# Patient Record
Sex: FEMALE | Race: BLACK OR AFRICAN AMERICAN
[De-identification: names, ages, dates, MRNs, and addresses within clinical notes are randomized per-mention and may not be internally consistent; named-entity substitution may affect disease eponyms.]

---

## 2019-11-09 ENCOUNTER — HOSPITAL ENCOUNTER (OUTPATIENT)
Dept: HOSPITAL 62 - ER | Age: 64
Setting detail: OBSERVATION
LOS: 1 days | Discharge: HOME | End: 2019-11-10
Attending: INTERNAL MEDICINE | Admitting: INTERNAL MEDICINE
Payer: COMMERCIAL

## 2019-11-09 DIAGNOSIS — R00.0: ICD-10-CM

## 2019-11-09 DIAGNOSIS — Z79.51: ICD-10-CM

## 2019-11-09 DIAGNOSIS — Z90.49: ICD-10-CM

## 2019-11-09 DIAGNOSIS — J44.1: Primary | ICD-10-CM

## 2019-11-09 DIAGNOSIS — R09.02: ICD-10-CM

## 2019-11-09 DIAGNOSIS — Z79.899: ICD-10-CM

## 2019-11-09 DIAGNOSIS — Z99.81: ICD-10-CM

## 2019-11-09 DIAGNOSIS — K21.9: ICD-10-CM

## 2019-11-09 DIAGNOSIS — I25.2: ICD-10-CM
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
## 2019-11-09 DIAGNOSIS — Z85.038: ICD-10-CM

## 2019-11-09 DIAGNOSIS — T48.6X5A: ICD-10-CM

## 2019-11-09 DIAGNOSIS — Z79.52: ICD-10-CM

## 2019-11-09 DIAGNOSIS — F17.210: ICD-10-CM

## 2019-11-09 DIAGNOSIS — J45.901: ICD-10-CM

## 2019-11-09 LAB
ADD MANUAL DIFF: NO
ALBUMIN SERPL-MCNC: 4 G/DL (ref 3.5–5)
ALP SERPL-CCNC: 93 U/L (ref 38–126)
ANION GAP SERPL CALC-SCNC: 5 MMOL/L (ref 5–19)
AST SERPL-CCNC: 23 U/L (ref 14–36)
BASOPHILS # BLD AUTO: 0 10^3/UL (ref 0–0.2)
BASOPHILS NFR BLD AUTO: 0.9 % (ref 0–2)
BILIRUB DIRECT SERPL-MCNC: 0.1 MG/DL (ref 0–0.4)
BILIRUB SERPL-MCNC: 0.4 MG/DL (ref 0.2–1.3)
BUN SERPL-MCNC: 10 MG/DL (ref 7–20)
CALCIUM: 10 MG/DL (ref 8.4–10.2)
CHLORIDE SERPL-SCNC: 107 MMOL/L (ref 98–107)
CO2 SERPL-SCNC: 29 MMOL/L (ref 22–30)
EOSINOPHIL # BLD AUTO: 0.1 10^3/UL (ref 0–0.6)
EOSINOPHIL NFR BLD AUTO: 1.5 % (ref 0–6)
ERYTHROCYTE [DISTWIDTH] IN BLOOD BY AUTOMATED COUNT: 14 % (ref 11.5–14)
GLUCOSE SERPL-MCNC: 118 MG/DL (ref 75–110)
HCT VFR BLD CALC: 47.8 % (ref 36–47)
HGB BLD-MCNC: 16 G/DL (ref 12–15.5)
LYMPHOCYTES # BLD AUTO: 1 10^3/UL (ref 0.5–4.7)
LYMPHOCYTES NFR BLD AUTO: 17.7 % (ref 13–45)
MCH RBC QN AUTO: 30.8 PG (ref 27–33.4)
MCHC RBC AUTO-ENTMCNC: 33.4 G/DL (ref 32–36)
MCV RBC AUTO: 92 FL (ref 80–97)
MONOCYTES # BLD AUTO: 0.4 10^3/UL (ref 0.1–1.4)
MONOCYTES NFR BLD AUTO: 6.9 % (ref 3–13)
NEUTROPHILS # BLD AUTO: 4 10^3/UL (ref 1.7–8.2)
NEUTS SEG NFR BLD AUTO: 73 % (ref 42–78)
PLATELET # BLD: 275 10^3/UL (ref 150–450)
POTASSIUM SERPL-SCNC: 4.8 MMOL/L (ref 3.6–5)
PROT SERPL-MCNC: 7 G/DL (ref 6.3–8.2)
RBC # BLD AUTO: 5.18 10^6/UL (ref 3.72–5.28)
TOTAL CELLS COUNTED % (AUTO): 100 %
WBC # BLD AUTO: 5.4 10^3/UL (ref 4–10.5)

## 2019-11-09 PROCEDURE — 85027 COMPLETE CBC AUTOMATED: CPT

## 2019-11-09 PROCEDURE — 80053 COMPREHEN METABOLIC PANEL: CPT

## 2019-11-09 PROCEDURE — 36415 COLL VENOUS BLD VENIPUNCTURE: CPT

## 2019-11-09 PROCEDURE — 99285 EMERGENCY DEPT VISIT HI MDM: CPT

## 2019-11-09 PROCEDURE — 85025 COMPLETE CBC W/AUTO DIFF WBC: CPT

## 2019-11-09 PROCEDURE — 84484 ASSAY OF TROPONIN QUANT: CPT

## 2019-11-09 PROCEDURE — G0378 HOSPITAL OBSERVATION PER HR: HCPCS

## 2019-11-09 PROCEDURE — 94640 AIRWAY INHALATION TREATMENT: CPT

## 2019-11-09 PROCEDURE — HZ31ZZZ INDIVIDUAL COUNSELING FOR SUBSTANCE ABUSE TREATMENT, BEHAVIORAL: ICD-10-PCS | Performed by: NURSE PRACTITIONER

## 2019-11-09 PROCEDURE — 80048 BASIC METABOLIC PNL TOTAL CA: CPT

## 2019-11-09 PROCEDURE — 96361 HYDRATE IV INFUSION ADD-ON: CPT

## 2019-11-09 PROCEDURE — 71046 X-RAY EXAM CHEST 2 VIEWS: CPT

## 2019-11-09 PROCEDURE — 93005 ELECTROCARDIOGRAM TRACING: CPT

## 2019-11-09 PROCEDURE — 93010 ELECTROCARDIOGRAM REPORT: CPT

## 2019-11-09 PROCEDURE — 99406 BEHAV CHNG SMOKING 3-10 MIN: CPT

## 2019-11-09 PROCEDURE — 83735 ASSAY OF MAGNESIUM: CPT

## 2019-11-09 PROCEDURE — 96374 THER/PROPH/DIAG INJ IV PUSH: CPT

## 2019-11-09 RX ADMIN — METHYLPREDNISOLONE SODIUM SUCCINATE SCH MG: 40 INJECTION, POWDER, FOR SOLUTION INTRAMUSCULAR; INTRAVENOUS at 21:53

## 2019-11-09 RX ADMIN — FAMOTIDINE SCH MG: 20 TABLET, FILM COATED ORAL at 21:53

## 2019-11-09 RX ADMIN — LEVALBUTEROL HYDROCHLORIDE SCH MG: 1.25 SOLUTION RESPIRATORY (INHALATION) at 20:10

## 2019-11-09 RX ADMIN — HEPARIN SODIUM SCH UNIT: 5000 INJECTION, SOLUTION INTRAVENOUS; SUBCUTANEOUS at 21:53

## 2019-11-09 RX ADMIN — IPRATROPIUM BROMIDE SCH MG: 0.5 SOLUTION RESPIRATORY (INHALATION) at 20:10

## 2019-11-09 NOTE — ER DOCUMENT REPORT
ED Respiratory Problem





- General


Chief Complaint: Breathing Difficulty


Stated Complaint: TROUBLE BREATHING


Time Seen by Provider: 19 14:34


Primary Care Provider: 


BRANDY PARRISH MD [Primary Care Provider] - Follow up as needed


TRAVEL OUTSIDE OF THE [place] IN LAST 30 DAYS: No





- HPI


Notes: 





63-year-old female with history of COPD to the emergency department via EMS with

complaints of progressively worsening shortness of breath since last night.  She

states that she woke up about 4 AM feeling very tight in her chest and took a 

breathing treatment.  She states that she continued to have episodes of waking 

up and feeling like her chest was tight.  She states that she is taken several 

breathing treatments prior to calling EMS.  She was given 2 breathing treatments

by EMS she states she feels a little bit better but still feels like she has 

chest tightness and shortness of breath.  She does have a history of 

hospitalization from her COPD and reports that she was on a ventilator last year

for it.  She denies any fevers or chills.  She states she is only coughing after

she gives herself a breathing treatment.  She denies any diaphoresis, nausea, 

vomiting, passing out, abdominal pain.  She states she is on oxygen at home.  

She is concerned because the chest tightness feels different to her today.  She 

still smokes and she states she takes about 4 to 5 cigarettes a day.  She also 

uses Breo and Spiriva daily.  She states that she took a magnesium oxide tablet 

last night in hopes that it might improve her COPD as well.  She also is on 5 mg

of prednisone daily.





- Related Data


Allergies/Adverse Reactions: 


                                        





No Known Allergies Allergy (Verified 19 18:12)


   











Past Medical History





- General


Information source: Patient, Relative





- Social History


Smoking Status: Current Every Day Smoker


Frequency of alcohol use: None


Drug Abuse: None


Lives with: Family


Family History: Reviewed & Not Pertinent





- Past Medical History


Cardiac Medical History: Reports: Hx Atrial Fibrillation


   Denies: Hx Coronary Artery Disease, Hx Heart Attack, Hx Hypertension, Hx 

Pulmonary Embolism


Pulmonary Medical History: Reports: Hx Asthma, Hx COPD, Hx Pneumonia


   Denies: Hx Bronchitis, Hx Respiratory Failure, Hx Sleep Apnea, Hx 

Tuberculosis


Neurological Medical History: Denies: Hx Cerebrovascular Accident, Hx Seizures


Endocrine Medical History: Denies: Hx Graves' Disease


Renal/ Medical History: Denies: Hx End Stage Renal Disease, Hx Kidney Stones, 

Hx Ovarian Cysts, Hx Peritoneal Dialysis, Hx Pelvic Inflammatory Disease


Malignancy Medical History: Denies: Hx Breast Cancer, Hx Cervical Cancer, Hx 

Lung Cancer, Hx Ovarian Cancer


GI Medical History: Reports: Hx Gastroesophageal Reflux Disease, Hx Ulcer.  

Denies: Hx Crohn's Disease, Hx Hiatal Hernia, Hx Irritable Bowel, Hx Liver 

Failure, Hx Pancreatitis


Musculoskeletal Medical History: Reports Hx Arthritis - HANDS, Denies Hx 

Fibromyalgia, Denies Hx Muscular Dystrophy, Denies Hx Systemic Lupus 

Erythematosus


Psychiatric Medical History: Reports: Hx Depression


   Denies: Hx Bipolar Disorder, Hx Post Traumatic Stress Disorder, Hx 

Schizophrenia


Traumatic Medical History: Denies: Hx Fractures


Past Surgical History: Reports: Hx Abdominal Surgery, Hx Gynecologic Surgery - 

fibroid removal, Hx Hysterectomy, Hx Tubal Ligation.  Denies: Hx Appendectomy, 

Hx Bowel Surgery, Hx  Section, Hx Cholecystectomy, Hx Colostomy, Hx 

Coronary Artery Bypass Graft, Hx Gastric Bypass Surgery, Hx Herniorrhaphy, Hx 

Mastectomy, Hx Pacemaker, Hx Tonsillectomy





- Immunizations


Hx Diphtheria, Pertussis, Tetanus Vaccination: Yes





Review of Systems





- Review of Systems


Constitutional: denies: Chills, Fever


EENT: No symptoms reported.  denies: Ear pain, Nose congestion, Sinus discharge


Cardiovascular: See HPI.  denies: Palpitations, Orthopnea, Dyspnea, Syncope, 

Dizziness, Lightheaded


Respiratory: Cough, Short of breath, Wheezing.  denies: Hurts to breathe, 

Sputum, Stridor


Gastrointestinal: denies: Abdominal pain, Diarrhea, Nausea, Vomiting


Genitourinary: No symptoms reported


Female Genitourinary: No symptoms reported


Musculoskeletal: No symptoms reported


Skin: No symptoms reported


Hematologic/Lymphatic: No symptoms reported


Neurological/Psychological: No symptoms reported


-: Yes All other systems reviewed and negative





Physical Exam





- Vital signs


Vitals: 


                                        











Temp Pulse Resp BP Pulse Ox


 


 98.8 F   99   28 H  122/84   96 


 


 19 13:50  19 13:50  19 13:50  19 13:50  19 13:50











Interpretation: Normal





- General


General appearance: Appears well, Alert


In distress: None





- HEENT


Head: Normocephalic, Atraumatic


Eyes: Normal


Pupils: PERRL


Ears: Normal


External canal: Normal


Tympanic membrane: Normal


Sinus: Normal


Nasal: Normal


Mouth/Lips: Normal


Mucous membranes: Normal


Pharynx: Normal.  No: Potential airway comprom.


Neck: Normal, Supple.  No: Lymphadenopathy, Meningismus





- Respiratory


Respiratory status: No respiratory distress


Chest status: Nontender, Prolonged expirations.  No: Pain on movement, Pain with

cough, Accessory muscle use


Breath sounds: Decreased air movement - patient had decreased air movement with 

noted expiratory wheezing throughout.   no accessory muscle use, no tripoding.  

Patient is a little breathy when speaking but still can speak in full 

sentences., Wheezing.  No: Rales, Rhonchi, Stridor


Chest palpation: Normal





- Cardiovascular


Rhythm: Regular


Heart sounds: Normal auscultation


Murmur: No





- Abdominal


Inspection: Normal


Distension: No distension


Bowel sounds: Normal


Tenderness: Nontender


Organomegaly: No organomegaly





- Back


Back: Normal, Nontender





- Neurological


Neuro grossly intact: Yes


Cognition: Normal


Orientation: AAOx4


Nick Coma Scale Eye Opening: Spontaneous


Nick Coma Scale Verbal: Oriented


Nick Coma Scale Motor: Obeys Commands


Nick Coma Scale Total: 15


Speech: Normal


Cranial nerves: Normal


Cerebellar coordination: Normal.  No: Gait ataxia


Motor strength normal: LUE, RUE, LLE, RLE


Additional motor exam normals: Equal .  No: Pronator drift


Sensory: Normal





- Psychological


Associated symptoms: Normal affect, Normal mood





- Skin


Skin Temperature: Warm


Skin Moisture: Dry


Skin Color: Normal





Course





- Re-evaluation


Re-evalutation: 





19 16:12


Rounded on patient.  She is feeling better after Solu-Medrol and another DuoNeb.

 Re-auscultation reveals that she still has diffuse expiratory wheezes but she 

is having better air movement than prior.  We will give her another breathing 

treatment and also give her a little bit of fluid.  She is tachycardic but 

suspect that this is related to the albuterol given to her.  We will monitor 

closely.


19 18:26


patient was ambulated -- she did not do well.  HR went up to 130 and she became 

more SOB.  O2 went down to 92% on RA.   Since she has not ambulated well, will 

seek admission for patient.


Discussed patient with Dr. Gaviria, Er Attending. He agrees with the plan for 

admission. Updated patient and family about the plan and they agree with plan 

for admission.  





Discussed patient with NP Rika Zacarias, hospitalist team, and she agrees with 

the plan for admission.  She will come and see the patient.  Would like for me 

to assign her a tele bed.   





Impression:  COPD exacerbation.  Patient still tachycardic and SOB when 

ambulating in the ER.   Will admit to the hospitalist service for further 

management.  





- Vital Signs


Vital signs: 


                                        











Temp Pulse Resp BP Pulse Ox


 


 98.8 F   99   22 H  134/87 H  96 


 


 19 13:50  19 13:50  19 16:01  19 16:00  19 16:01














- Laboratory


Result Diagrams: 


                                 19 14:15





                                 19 15:10


Laboratory results interpreted by me: 


                                        











  19





  14:15 15:10


 


Hgb  16.0 H 


 


Hct  47.8 H 


 


Glucose   118 H














- Diagnostic Test


Radiology reviewed: Image reviewed, Reports reviewed





- EKG Interpretation by Me


EKG shows normal: Sinus rhythm


Rate: Tachycardia


Rhythm: Other - sinus tachycardia


When compared to previous EKG there are: No significant change


Additional EKG results interpreted by me: 





19 


No STEMI, no ST changes, no change from prior on 18





Discharge





- Discharge


Clinical Impression: 


 COPD exacerbation





Condition: Stable


Disposition: ADMITTED AS INPATIENT


Admitting Provider: Lewis (Hospitalist)


Unit Admitted: Telemetry


Referrals: 


BRANDY PARRISH MD [Primary Care Provider] - Follow up as needed

## 2019-11-09 NOTE — EKG REPORT
SEVERITY:- BORDERLINE ECG -

SINUS TACHYCARDIA

LOW VOLTAGE IN FRONTAL LEADS

CONSIDER INFERIOR INFARCT

:

Confirmed by: Cody Fowler 09-Nov-2019 21:33:38

## 2019-11-09 NOTE — RADIOLOGY REPORT (SQ)
EXAM DESCRIPTION:  CHEST 2 VIEWS



COMPLETED DATE/TIME:  11/9/2019 3:27 pm



REASON FOR STUDY:  SOB, chest tightness



COMPARISON:  11/10/2018



EXAM PARAMETERS:  NUMBER OF VIEWS: two views

TECHNIQUE: Digital Frontal and Lateral radiographic views of the chest acquired.

RADIATION DOSE: NA

LIMITATIONS: none



FINDINGS:  LUNGS AND PLEURA: No opacities, masses or pneumothorax. No pleural effusion.

MEDIASTINUM AND HILAR STRUCTURES: No masses or contour abnormalities.

HEART AND VASCULAR STRUCTURES: Heart normal size.  No evidence for failure.

BONES: No acute findings.

HARDWARE: None in the chest.

OTHER: No other significant finding.



IMPRESSION:  NO ACUTE RADIOGRAPHIC FINDING IN THE CHEST.



TECHNICAL DOCUMENTATION:  JOB ID:  [id number]

 2011 [employer]- All Rights Reserved



Reading location - IP/workstation name: [email or web address]

## 2019-11-10 VITALS — DIASTOLIC BLOOD PRESSURE: 74 MMHG | SYSTOLIC BLOOD PRESSURE: 122 MMHG

## 2019-11-10 LAB
ANION GAP SERPL CALC-SCNC: 10 MMOL/L (ref 5–19)
BUN SERPL-MCNC: 11 MG/DL (ref 7–20)
CALCIUM: 9.7 MG/DL (ref 8.4–10.2)
CHLORIDE SERPL-SCNC: 111 MMOL/L (ref 98–107)
CO2 SERPL-SCNC: 21 MMOL/L (ref 22–30)
ERYTHROCYTE [DISTWIDTH] IN BLOOD BY AUTOMATED COUNT: 13.5 % (ref 11.5–14)
GLUCOSE SERPL-MCNC: 128 MG/DL (ref 75–110)
HCT VFR BLD CALC: 42 % (ref 36–47)
HGB BLD-MCNC: 14.3 G/DL (ref 12–15.5)
MCH RBC QN AUTO: 31 PG (ref 27–33.4)
MCHC RBC AUTO-ENTMCNC: 34 G/DL (ref 32–36)
MCV RBC AUTO: 91 FL (ref 80–97)
PLATELET # BLD: 253 10^3/UL (ref 150–450)
POTASSIUM SERPL-SCNC: 4.6 MMOL/L (ref 3.6–5)
RBC # BLD AUTO: 4.61 10^6/UL (ref 3.72–5.28)
WBC # BLD AUTO: 6.8 10^3/UL (ref 4–10.5)

## 2019-11-10 RX ADMIN — LEVALBUTEROL HYDROCHLORIDE SCH MG: 1.25 SOLUTION RESPIRATORY (INHALATION) at 08:18

## 2019-11-10 RX ADMIN — IPRATROPIUM BROMIDE SCH MG: 0.5 SOLUTION RESPIRATORY (INHALATION) at 08:18

## 2019-11-10 RX ADMIN — IPRATROPIUM BROMIDE SCH MG: 0.5 SOLUTION RESPIRATORY (INHALATION) at 01:54

## 2019-11-10 RX ADMIN — HEPARIN SODIUM SCH UNIT: 5000 INJECTION, SOLUTION INTRAVENOUS; SUBCUTANEOUS at 05:37

## 2019-11-10 RX ADMIN — LEVALBUTEROL HYDROCHLORIDE SCH MG: 1.25 SOLUTION RESPIRATORY (INHALATION) at 01:54

## 2019-11-10 RX ADMIN — METHYLPREDNISOLONE SODIUM SUCCINATE SCH MG: 40 INJECTION, POWDER, FOR SOLUTION INTRAMUSCULAR; INTRAVENOUS at 05:37

## 2019-11-10 RX ADMIN — FAMOTIDINE SCH MG: 20 TABLET, FILM COATED ORAL at 10:37

## 2019-11-10 NOTE — PDOC DISCHARGE SUMMARY
Impression





- Admit/DC Date/PCP


Admission Date/Primary Care Provider: 


  11/09/19 18:49





  BRANDY PARRISH MD





Discharge Date: 11/10/19





- Discharge Diagnosis


(1) Asthma exacerbation in COPD


Is this a current diagnosis for this admission?: Yes   





(2) GERD (gastroesophageal reflux disease)


Is this a current diagnosis for this admission?: Yes   





(3) Tachycardia


Is this a current diagnosis for this admission?: Yes   





(4) Tobacco dependence


Is this a current diagnosis for this admission?: Yes   





- Additional Information


Resuscitation Status: Full Code


Discharge Diet: Cardiac


Discharge Activity: Activity As Tolerated, Balance Activity w/Rest, Slowly 

Increase Activity


Referrals: 


BRANDY PARRISH MD [Primary Care Provider] - Follow up as needed


Prescriptions: 


Prednisone [Deltasone 20 mg Tablet] 20 mg PO ASDIR PRN #20 tablet


 PRN Reason: 


Ipratropium/Albuterol Sulfate [Duoneb 3 ml Ampul] 3 ml NEB [id number] PRN #60 

vial.neb


 PRN Reason: For Wheezing


Montelukast Sodium [Singulair 10 mg Tablet] 10 mg PO QHS #30 tablet


Albuterol Sulfate [Ventolin 0.083% Neb 2.5 mg/3 mL Ampul] 3 ml NEB Q6HP PRN #120


 PRN Reason: 


Home Medications: 








Acetaminophen [Tylenol 325 mg Tablet] 650 mg PO Q4HP PRN  tablet 11/10/19 


Albuterol Sulfate [Albuterol Sulfate Hfa] 1 puff IH Q6HP PRN 11/10/19 


Albuterol Sulfate [Albuterol Sulfate Hfa] 2 puff IH Q6 11/10/19 


Albuterol Sulfate [Ventolin 0.083% Neb 2.5 mg/3 mL Ampul] 3 ml NEB Q6HP PRN #120

11/10/19 


Fluticasone/Vilanterol [Breo Ellipta 200-25 Mcg INH] 1 puff IH DAILY 11/10/19 


Ipratropium/Albuterol Sulfate [Duoneb 3 ml Ampul] 3 ml NEB [id number] PRN #60 

vial.neb 11/10/19 


Lansoprazole [Prevacid] 30 mg PO BID 11/10/19 


Montelukast Sodium [Singulair 10 mg Tablet] 10 mg PO QHS #30 tablet 11/10/19 


Nicotine Polacrilex [Nicotine Lozenge] 2 mg PO Q2HP PRN 11/10/19 


Prednisone [Deltasone 20 mg Tablet] 20 mg PO ASDIR PRN #20 tablet 11/10/19 


Tiotropium Bromide [Spiriva Respimat] 2 puff IH DAILY 11/10/19 











History of Present Illiness


History of Present Illness: 


EWELINA HAND is a 63 year old female with a past medical history of asthma,

COPD, GERD, atrial fibrillation, non-STEMI, and obesity who presented to the 

emergency department with a 2-day complaint of progressively worsening shortness

of breath and nonproductive cough unresponsive to home nebulizer treatments.


The patient previously was followed by Dr. Clay and received frequent Xolair 

outpatient infusions; has not received this month's dose due to change of 

pulmonologist.


Evaluation in the emergency department revealed tachycardia, tachypnea, hypoxia 

on room air, and otherwise unremarkable work-up with normal CBC, chemistry, 

troponin, chest x-ray, and EKG demonstrating sinus tachycardia only.


Patient is already been provided IV Solu-Medrol, Pepcid, and multiple DuoNeb 

treatments without relief of shortness of breath or wheezing.


Therefore she is referred to the hospitalist service for admission and 

management of the above-stated complaints and findings.








Hospital Course


Hospital Course: 


The patient was admitted to the medical floor and continuous cardiac telemetry. 

She was provided supplemental oxygen, IV Solu-Medrol, scheduled and as needed 

nebulizer treatments.  Her symptoms rapidly improved and the following morning 

she was requesting to be discharged home.  The patient was able to ambulate the 

hallways while on room air without increased work of breathing, maintaining 

oxygen saturations in the mid 90s, with her heart rate elevating to 126, 

however, her heart rate quickly recovered upon rest.


Patient reports that she has a new patient appointment scheduled with Dr. Tyson for Tuesday.  She reports that she has a nebulizer machine at home with 

plenty of medication.





Patient is discharged home in stable condition.


She is provided prescriptions for a refill of her albuterol nebulizer solution, 

DuoNeb solutions, Singulair, and a prednisone taper.


She is advised to follow-up with her primary care provider within 1 week and to 

keep her previously scheduled appointment with Dr. Tyson on Tuesday.


She is instructed to stop smoking and to avoid all other known respiratory 

triggers.


She is instructed to take her medications as prescribed.


She is encouraged to return to the emergency department as needed for concerning

symptoms.





Physical Exam


Vital Signs: 


                                        











Temp Pulse Resp BP Pulse Ox


 


 98.3 F   110 H  19   122/74   94 


 


 11/10/19 11:22  11/10/19 11:22  11/10/19 11:22  11/10/19 11:22  11/10/19 11:22








                                 Intake & Output











 11/09/19 11/10/19 11/11/19





 06:59 06:59 06:59


 


Intake Total  1100 300


 


Balance  1100 300


 


Weight  76.3 kg 











General appearance: PRESENT: no acute distress, cooperative, well-developed, 

well-nourished


Head exam: PRESENT: atraumatic, normocephalic


Eye exam: PRESENT: conjunctiva pink, EOMI, PERRLA.  ABSENT: scleral icterus


Ear exam: PRESENT: normal external ear exam


Mouth exam: PRESENT: moist, tongue midline


Respiratory exam: PRESENT: clear to auscultation taniya, symmetrical, unlabored, 

wheezes - Scant wheezing to right middle and lower fields; significantly improve

d from yesterday.  ABSENT: rales, rhonchi


Cardiovascular exam: PRESENT: RRR, +S1, +S2, tachycardia - <110 at rest.  

ABSENT: diastolic murmur, rubs, systolic murmur


Vascular exam: PRESENT: normal capillary refill


Extremities exam: PRESENT: full ROM.  ABSENT: calf tenderness, clubbing, pedal 

edema


Musculoskeletal exam: PRESENT: ambulatory - Ambulatory on room air without 

increased work of breathing


Neurological exam: PRESENT: alert, awake, oriented to person, oriented to place,

oriented to time, oriented to situation, CN II-XII grossly intact.  ABSENT: 

motor sensory deficit


Psychiatric exam: PRESENT: appropriate affect, normal mood.  ABSENT: homicidal 

ideation, suicidal ideation


Skin exam: PRESENT: dry, intact, warm.  ABSENT: cyanosis, rash





Results


Laboratory Results: 


                                        











WBC  6.8 10^3/uL (4.0-10.5)   11/10/19  04:15    


 


RBC  4.61 10^6/uL (3.72-5.28)   11/10/19  04:15    


 


Hgb  14.3 g/dL (12.0-15.5)   11/10/19  04:15    


 


Hct  42.0 % (36.0-47.0)   11/10/19  04:15    


 


MCV  91 fl (80-97)   11/10/19  04:15    


 


MCH  31.0 pg (27.0-33.4)   11/10/19  04:15    


 


MCHC  34.0 g/dL (32.0-36.0)   11/10/19  04:15    


 


RDW  13.5 % (11.5-14.0)   11/10/19  04:15    


 


Plt Count  253 10^3/uL (150-450)   11/10/19  04:15    


 


Lymph % (Auto)  17.7 % (13-45)   11/09/19  14:15    


 


Mono % (Auto)  6.9 % (3-13)   11/09/19  14:15    


 


Eos % (Auto)  1.5 % (0-6)   11/09/19  14:15    


 


Baso % (Auto)  0.9 % (0-2)   11/09/19  14:15    


 


Absolute Neuts (auto)  4.0 10^3/uL (1.7-8.2)   11/09/19  14:15    


 


Absolute Lymphs (auto)  1.0 10^3/uL (0.5-4.7)   11/09/19  14:15    


 


Absolute Monos (auto)  0.4 10^3/uL (0.1-1.4)   11/09/19  14:15    


 


Absolute Eos (auto)  0.1 10^3/uL (0.0-0.6)   11/09/19  14:15    


 


Absolute Basos (auto)  0.0 10^3/uL (0.0-0.2)   11/09/19  14:15    


 


Seg Neutrophils %  73.0 % (42-78)   11/09/19  14:15    


 


Sodium  141.5 mmol/L (137-145)   11/10/19  04:15    


 


Potassium  4.6 mmol/L (3.6-5.0)   11/10/19  04:15    


 


Chloride  111 mmol/L ()  H  11/10/19  04:15    


 


Carbon Dioxide  21 mmol/L (22-30)  L  11/10/19  04:15    


 


Anion Gap  10  (5-19)   11/10/19  04:15    


 


BUN  11 mg/dL (7-20)   11/10/19  04:15    


 


Creatinine  0.67 mg/dL (0.52-1.25)   11/10/19  04:15    


 


Est GFR ( Amer)  > 60  (>60)   11/10/19  04:15    


 


Est GFR (Non-Af Amer)  Cancelled   11/09/19  14:15    


 


Est GFR (MDRD) Non-Af  > 60  (>60)   11/10/19  04:15    


 


Glucose  128 mg/dL ()  H  11/10/19  04:15    


 


Calcium  9.7 mg/dL (8.4-10.2)   11/10/19  04:15    


 


Magnesium  2.0 mg/dL (1.6-2.3)   11/09/19  15:10    


 


Total Bilirubin  0.4 mg/dL (0.2-1.3)   11/09/19  15:10    


 


Direct Bilirubin  0.1 mg/dL (0.0-0.4)   11/09/19  15:10    


 


Neonat Total Bilirubin  Not Reportable   11/09/19  15:10    


 


Neonat Direct Bilirubin  Not Reportable   11/09/19  15:10    


 


Neonat Indirect Bili  Not Reportable   11/09/19  15:10    


 


AST  23 U/L (14-36)   11/09/19  15:10    


 


ALT  16 U/L (<35)   11/09/19  15:10    


 


Alkaline Phosphatase  93 U/L ()   11/09/19  15:10    


 


Troponin I  < 0.012 ng/mL  11/09/19  18:04    


 


Total Protein  7.0 g/dL (6.3-8.2)   11/09/19  15:10    


 


Albumin  4.0 g/dL (3.5-5.0)   11/09/19  15:10    


 


EGFR   Cancelled   11/09/19  14:15    








                                        











  11/09/19 11/09/19 11/09/19





  14:15 15:10 18:04


 


Troponin I  Cancelled  < 0.012  < 0.012











Impressions: 


                                        





Chest X-Ray  11/09/19 14:46


IMPRESSION:  NO ACUTE RADIOGRAPHIC FINDING IN THE CHEST.


 














Plan


Plan of Treatment: 


The patient is discharged home in stable condition.


She is advised to follow-up with her primary care provider within 1 week and to 

keep her previously scheduled appointment with Dr. Tyson on Tuesday.


She is instructed to stop smoking and to avoid all other known respiratory 

triggers.


She is instructed to take her medications as prescribed.


She is encouraged to return to the emergency department as needed for concerning

symptoms.


Time Spent: Greater than 30 Minutes





Stroke


Is this a Stroke Patient?: No





Acute Heart Failure





- **


Is this a Heart Failure Patient?: No

## 2019-11-14 ENCOUNTER — HOSPITAL ENCOUNTER (OUTPATIENT)
Dept: HOSPITAL 62 - OD | Age: 64
End: 2019-11-14
Attending: NURSE PRACTITIONER
Payer: COMMERCIAL

## 2019-11-14 DIAGNOSIS — J44.9: ICD-10-CM

## 2019-11-14 DIAGNOSIS — R94.2: Primary | ICD-10-CM

## 2019-11-14 LAB
ADD MANUAL DIFF: NO
BASOPHILS # BLD AUTO: 0 10^3/UL (ref 0–0.2)
BASOPHILS NFR BLD AUTO: 0.5 % (ref 0–2)
EOSINOPHIL # BLD AUTO: 0.1 10^3/UL (ref 0–0.6)
EOSINOPHIL NFR BLD AUTO: 1 % (ref 0–6)
ERYTHROCYTE [DISTWIDTH] IN BLOOD BY AUTOMATED COUNT: 13.9 % (ref 11.5–14)
HCT VFR BLD CALC: 44.6 % (ref 36–47)
HGB BLD-MCNC: 15 G/DL (ref 12–15.5)
LYMPHOCYTES # BLD AUTO: 1.8 10^3/UL (ref 0.5–4.7)
LYMPHOCYTES NFR BLD AUTO: 22 % (ref 13–45)
MCH RBC QN AUTO: 31 PG (ref 27–33.4)
MCHC RBC AUTO-ENTMCNC: 33.7 G/DL (ref 32–36)
MCV RBC AUTO: 92 FL (ref 80–97)
MONOCYTES # BLD AUTO: 0.8 10^3/UL (ref 0.1–1.4)
MONOCYTES NFR BLD AUTO: 10 % (ref 3–13)
NEUTROPHILS # BLD AUTO: 5.5 10^3/UL (ref 1.7–8.2)
NEUTS SEG NFR BLD AUTO: 66.5 % (ref 42–78)
PLATELET # BLD: 288 10^3/UL (ref 150–450)
RBC # BLD AUTO: 4.85 10^6/UL (ref 3.72–5.28)
TOTAL CELLS COUNTED % (AUTO): 100 %
WBC # BLD AUTO: 8.2 10^3/UL (ref 4–10.5)

## 2019-11-14 PROCEDURE — 86021 WBC ANTIBODY IDENTIFICATION: CPT

## 2019-11-14 PROCEDURE — 36415 COLL VENOUS BLD VENIPUNCTURE: CPT

## 2019-11-14 PROCEDURE — 86235 NUCLEAR ANTIGEN ANTIBODY: CPT

## 2019-11-14 PROCEDURE — 86003 ALLG SPEC IGE CRUDE XTRC EA: CPT

## 2019-11-14 PROCEDURE — 86430 RHEUMATOID FACTOR TEST QUAL: CPT

## 2019-11-14 PROCEDURE — 85025 COMPLETE CBC W/AUTO DIFF WBC: CPT

## 2019-11-14 PROCEDURE — 86225 DNA ANTIBODY NATIVE: CPT

## 2019-11-14 PROCEDURE — 82785 ASSAY OF IGE: CPT

## 2019-11-15 LAB
ANTICHROMATIN AB: <0.2 AI (ref 0–0.9)
ATYPICAL PANCA: (no result) TITER
CENTROMERE B AB: <0.2 AI (ref 0–0.9)
CYTOPLASMIC (C-ANCA): (no result) TITER
DNA DOUBLE STRAND ANTIBODY ANA: <1 IU/ML (ref 0–9)
ENA JO1 AB SER-ACNC: <0.2 AI (ref 0–0.9)
SJOGREN'S ANTI-SS-B AB: <0.2 AI (ref 0–0.9)
SJOGREN'S SS-A ANTIBODY: 0.2 AI (ref 0–0.9)

## 2019-11-16 LAB
A ALTERNATA IGE QN: 0.13 KU/L
A FUMIGATUS IGE QN: 3.28 KU/L
A PULLULANS IGE QN: 0.11 KU/L
C ALBICANS IGE QN: 3.41 KU/L
C HERBARUM IGE QN: 0.12 KU/L
E PURPURASCENS IGE QN: <0.1 KU/L
F MONILIFORME IGE QN: <0.1 KU/L
M RACEMOSUS IGE QN: <0.1 KU/L
P BETAE IGE QN: <0.1 KU/L
P NOTATUM IGE QN: 0.27 KU/L

## 2019-11-17 LAB — S BOTRYOSUM IGE QN: 0.11 KU/L

## 2020-01-05 ENCOUNTER — HOSPITAL ENCOUNTER (OUTPATIENT)
Dept: HOSPITAL 62 - ER | Age: 65
Setting detail: OBSERVATION
LOS: 3 days | Discharge: HOME | End: 2020-01-08
Attending: INTERNAL MEDICINE | Admitting: INTERNAL MEDICINE
Payer: COMMERCIAL

## 2020-01-05 DIAGNOSIS — I25.2: ICD-10-CM

## 2020-01-05 DIAGNOSIS — Z86.79: ICD-10-CM

## 2020-01-05 DIAGNOSIS — J45.901: ICD-10-CM

## 2020-01-05 DIAGNOSIS — Z90.49: ICD-10-CM

## 2020-01-05 DIAGNOSIS — Z85.038: ICD-10-CM

## 2020-01-05 DIAGNOSIS — J20.9: ICD-10-CM

## 2020-01-05 DIAGNOSIS — Z79.51: ICD-10-CM

## 2020-01-05 DIAGNOSIS — Z82.49: ICD-10-CM

## 2020-01-05 DIAGNOSIS — J44.0: ICD-10-CM

## 2020-01-05 DIAGNOSIS — Z79.52: ICD-10-CM

## 2020-01-05 DIAGNOSIS — R00.0: ICD-10-CM

## 2020-01-05 DIAGNOSIS — F17.200: ICD-10-CM

## 2020-01-05 DIAGNOSIS — Z79.899: ICD-10-CM

## 2020-01-05 DIAGNOSIS — K21.9: ICD-10-CM

## 2020-01-05 DIAGNOSIS — J44.1: Primary | ICD-10-CM

## 2020-01-05 DIAGNOSIS — J96.01: ICD-10-CM

## 2020-01-05 LAB
ADD MANUAL DIFF: NO
ALBUMIN SERPL-MCNC: 4.2 G/DL (ref 3.5–5)
ALP SERPL-CCNC: 109 U/L (ref 38–126)
ANION GAP SERPL CALC-SCNC: 10 MMOL/L (ref 5–19)
AST SERPL-CCNC: 28 U/L (ref 14–36)
BASE EXCESS BLDV CALC-SCNC: -9.2 MMOL/L
BASOPHILS # BLD AUTO: 0.1 10^3/UL (ref 0–0.2)
BASOPHILS NFR BLD AUTO: 0.9 % (ref 0–2)
BILIRUB DIRECT SERPL-MCNC: 0.3 MG/DL (ref 0–0.4)
BILIRUB SERPL-MCNC: 0.3 MG/DL (ref 0.2–1.3)
BUN SERPL-MCNC: 9 MG/DL (ref 7–20)
CALCIUM: 9.8 MG/DL (ref 8.4–10.2)
CHLORIDE SERPL-SCNC: 105 MMOL/L (ref 98–107)
CK MB SERPL-MCNC: 0.61 NG/ML (ref ?–4.55)
CK SERPL-CCNC: 82 U/L (ref 30–135)
CO2 SERPL-SCNC: 25 MMOL/L (ref 22–30)
EOSINOPHIL # BLD AUTO: 0.3 10^3/UL (ref 0–0.6)
EOSINOPHIL NFR BLD AUTO: 3.5 % (ref 0–6)
ERYTHROCYTE [DISTWIDTH] IN BLOOD BY AUTOMATED COUNT: 13.7 % (ref 11.5–14)
GLUCOSE SERPL-MCNC: 107 MG/DL (ref 75–110)
HCO3 BLDV-SCNC: 15.7 MMOL/L (ref 20–32)
HCT VFR BLD CALC: 48.5 % (ref 36–47)
HGB BLD-MCNC: 16.1 G/DL (ref 12–15.5)
LYMPHOCYTES # BLD AUTO: 2.9 10^3/UL (ref 0.5–4.7)
LYMPHOCYTES NFR BLD AUTO: 35.3 % (ref 13–45)
MCH RBC QN AUTO: 30.8 PG (ref 27–33.4)
MCHC RBC AUTO-ENTMCNC: 33.2 G/DL (ref 32–36)
MCV RBC AUTO: 93 FL (ref 80–97)
MONOCYTES # BLD AUTO: 1 10^3/UL (ref 0.1–1.4)
MONOCYTES NFR BLD AUTO: 12.6 % (ref 3–13)
NEUTROPHILS # BLD AUTO: 3.9 10^3/UL (ref 1.7–8.2)
NEUTS SEG NFR BLD AUTO: 47.7 % (ref 42–78)
PCO2 BLDV: 31.8 MMHG (ref 35–63)
PH BLDV: 7.31 [PH] (ref 7.3–7.42)
PLATELET # BLD: 335 10^3/UL (ref 150–450)
POTASSIUM SERPL-SCNC: 4.5 MMOL/L (ref 3.6–5)
PROT SERPL-MCNC: 7.5 G/DL (ref 6.3–8.2)
RBC # BLD AUTO: 5.23 10^6/UL (ref 3.72–5.28)
TOTAL CELLS COUNTED % (AUTO): 100 %
TROPONIN I SERPL-MCNC: < 0.012 NG/ML
WBC # BLD AUTO: 8.3 10^3/UL (ref 4–10.5)

## 2020-01-05 PROCEDURE — 93010 ELECTROCARDIOGRAM REPORT: CPT

## 2020-01-05 PROCEDURE — 84484 ASSAY OF TROPONIN QUANT: CPT

## 2020-01-05 PROCEDURE — 93005 ELECTROCARDIOGRAM TRACING: CPT

## 2020-01-05 PROCEDURE — 99285 EMERGENCY DEPT VISIT HI MDM: CPT

## 2020-01-05 PROCEDURE — 82550 ASSAY OF CK (CPK): CPT

## 2020-01-05 PROCEDURE — 71045 X-RAY EXAM CHEST 1 VIEW: CPT

## 2020-01-05 PROCEDURE — 80053 COMPREHEN METABOLIC PANEL: CPT

## 2020-01-05 PROCEDURE — 85025 COMPLETE CBC W/AUTO DIFF WBC: CPT

## 2020-01-05 PROCEDURE — 96375 TX/PRO/DX INJ NEW DRUG ADDON: CPT

## 2020-01-05 PROCEDURE — 96366 THER/PROPH/DIAG IV INF ADDON: CPT

## 2020-01-05 PROCEDURE — G0378 HOSPITAL OBSERVATION PER HR: HCPCS

## 2020-01-05 PROCEDURE — 82803 BLOOD GASES ANY COMBINATION: CPT

## 2020-01-05 PROCEDURE — 94668 MNPJ CHEST WALL SBSQ: CPT

## 2020-01-05 PROCEDURE — 82553 CREATINE MB FRACTION: CPT

## 2020-01-05 PROCEDURE — 94667 MNPJ CHEST WALL 1ST: CPT

## 2020-01-05 PROCEDURE — 96365 THER/PROPH/DIAG IV INF INIT: CPT

## 2020-01-05 PROCEDURE — 94640 AIRWAY INHALATION TREATMENT: CPT

## 2020-01-05 PROCEDURE — 36415 COLL VENOUS BLD VENIPUNCTURE: CPT

## 2020-01-05 RX ADMIN — MAGNESIUM SULFATE IN DEXTROSE SCH MLS/HR: 10 INJECTION, SOLUTION INTRAVENOUS at 22:48

## 2020-01-05 RX ADMIN — MAGNESIUM SULFATE IN DEXTROSE SCH MLS/HR: 10 INJECTION, SOLUTION INTRAVENOUS at 23:27

## 2020-01-05 NOTE — ER DOCUMENT REPORT
ED Respiratory Problem





- General


Chief Complaint: Shortness Of Breath


Stated Complaint: DIFFICULTY BREATHING


Time Seen by Provider: 20 22:35


Notes: 


Patient is a 64-year-old female that comes to the emergency department for chief

complaint of difficulty breathing.  She states this is started over the past 

couple of days and became much worse tonight so she called EMS.  She was found 

to be 91% on room air initially, wheezing, EMS gave her 1 DuoNeb and 1 albuterol

treatment.  Patient takes 5 mg of prednisone daily, has home nebulizers, has 

asthma and COPD and follows with his pulmonologist Dr. Tyson.  She is not on 

home oxygen.  She admits she has been hospitalized and that years ago she was 

even intubated for COPD exacerbation.  She denies fever/chills, chest pain, 

nausea/vomiting, or any other complaints.  She denies medical history other than

COPD including CAD, CHF, diabetes.  She does continue to smoke.





TRAVEL OUTSIDE OF THE [place] IN LAST 30 DAYS: No





- Related Data


Allergies/Adverse Reactions: 


                                        





No Known Allergies Allergy (Verified 19 11:11)


   











Past Medical History





- General


Information source: Patient





- Social History


Smoking Status: Current Every Day Smoker


Smoking Education Provided: Yes - <3 min


Frequency of alcohol use: None


Drug Abuse: None


Lives with: Family


Family History: Reviewed & Not Pertinent


Patient has suicidal ideation: No


Patient has homicidal ideation: No





- Past Medical History


Cardiac Medical History: Reports: Hx Atrial Fibrillation, Hx Heart Attack


   Denies: Hx Coronary Artery Disease, Hx Hypertension, Hx Pulmonary Embolism


Pulmonary Medical History: Reports: Hx Asthma, Hx COPD, Hx Pneumonia


   Denies: Hx Bronchitis, Hx Respiratory Failure, Hx Sleep Apnea, Hx 

Tuberculosis


Neurological Medical History: Denies: Hx Cerebrovascular Accident, Hx Seizures


Endocrine Medical History: Denies: Hx Graves' Disease


Renal/ Medical History: Denies: Hx End Stage Renal Disease, Hx Kidney Stones, 

Hx Ovarian Cysts, Hx Peritoneal Dialysis, Hx Pelvic Inflammatory Disease


Malignancy Medical History: Reports: Hx Colorectal Cancer.  Denies: Hx Breast 

Cancer, Hx Cervical Cancer, Hx Lung Cancer, Hx Ovarian Cancer


GI Medical History: Reports: Hx Gastroesophageal Reflux Disease, Hx Ulcer.  

Denies: Hx Crohn's Disease, Hx Hiatal Hernia, Hx Irritable Bowel, Hx Liver 

Failure, Hx Pancreatitis


Musculoskeletal Medical History: Reports Hx Arthritis, Denies Hx Fibromyalgia, 

Denies Hx Muscular Dystrophy, Denies Hx Systemic Lupus Erythematosus


Psychiatric Medical History: Reports: Hx Depression


   Denies: Hx Bipolar Disorder, Hx Post Traumatic Stress Disorder, Hx 

Schizophrenia


Traumatic Medical History: Denies: Hx Fractures


Past Surgical History: Reports: Hx Abdominal Surgery, Hx Cholecystectomy, Hx 

Gynecologic Surgery - fibroid removal, Hx Hysterectomy, Hx Tubal Ligation, Other

- Bowel resection for colon cancer.  Denies: Hx Appendectomy, Hx Bowel Surgery, 

Hx  Section, Hx Colostomy, Hx Coronary Artery Bypass Graft, Hx Gastric 

Bypass Surgery, Hx Herniorrhaphy, Hx Mastectomy, Hx Pacemaker, Hx Tonsillectomy





- Immunizations


Hx Diphtheria, Pertussis, Tetanus Vaccination: Yes





Review of Systems





- Review of Systems


Constitutional: No symptoms reported


EENT: No symptoms reported


Cardiovascular: See HPI


Respiratory: See HPI


Gastrointestinal: No symptoms reported


Genitourinary: No symptoms reported


Female Genitourinary: No symptoms reported


Musculoskeletal: No symptoms reported


Skin: No symptoms reported


Hematologic/Lymphatic: No symptoms reported


Neurological/Psychological: No symptoms reported





Physical Exam





- Vital signs


Vitals: 


                                        











Resp


 


 29 H


 


 20 22:08














- Notes


Notes: 





GENERAL: Alert, interactive, speaks in very short sentences


HEAD: Normocephalic, atraumatic.


EYES: Pupils equal, round, and reactive to light. Extraocular movements intact.


ENT: Oral mucosa moist, tongue midline. Oropharynx unremarkable. Airway patent. 

Nares patent, no nasal septal hematoma, TM's intact.


NECK: Full range of motion. Supple. Trachea midline.


LUNGS: Decreased breath sounds with expiratory wheezes throughout.  Tachypnea 

with slightly labored breathing.  Occasional tight cough.


HEART: Tachycardia, normal rhythm, no murmur


ABDOMEN: Soft, non-tender. Non-distended. 


EXTREMITIES: Moves all 4 extremities spontaneously. No edema, normal radial and 

dorsalis pedis pulses bilaterally. No cyanosis.


BACK: no cervical, thoracic, lumbar midline tenderness. No saddle anesthesia, 

normal distal neurovascular exam. Moves all extremities in full range of motion.


NEUROLOGICAL: Alert and oriented x3. Normal speech. Cranial nerves II through 

XII grossly intact. 


PSYCH: Normal affect, normal mood.


SKIN: Warm, dry, normal turgor. No rashes or lesions noted.





Course





- Re-evaluation


Re-evalutation: 


On initial evaluation patient with tachypnea, tachycardia, decreased breath 

sounds, expiratory wheezes.  Placed on oxygen, given duo nebs, Solu-Medrol, 

magnesium.





20 


Patient is much improved on reevaluation.  Wheezing is almost resolved, heart 

rate is improved, blood pressure improved, patient no longer has labored 

breathing and appears much more relaxed.  She is more talkative.  Still 

receiving IV fluids and magnesium.





On evaluation patient is wheezing slightly worse again, she will be given 

additional treatment.





On reevaluation again patient is much relaxed, on oxygen, she is mildly 

tachycardic, she states she feels much better and she wants to go home.  However

because of her history, repeated wheezing, borderline respiratory status, I did 

recommend admission.  Patient declines, states she wants to go home.  Patient 

does agree to attempt to ambulate to evaluate her respiratory status.





20


Patient did very poorly with ambulation, she became hypoxic into the upper 80s, 

she became extremely dyspneic and actually had to stop and be placed in a 

wheelchair to be rolled back to the room because she could not complete the 

evaluation.  After this patient did agree to be admitted to the hospital for 

COPD exacerbation. Discussed with Dr. Murrieta. 





Discussed with Dr. Alegria, hospitalist, patient accepted to telemetry 

observation.











- Vital Signs


Vital signs: 


                                        











Temp Pulse Resp BP Pulse Ox


 


 97.8 F   112 H  22 H  140/101 H  99 


 


 20 03:31  20 23:33  20 03:31  20 03:31  20 03:31














- Laboratory


Result Diagrams: 


                                 20 22:16





                                 20 22:16


Laboratory results interpreted by me: 


                                        











  20





  22:16 23:00


 


Hgb  16.1 H 


 


Hct  48.5 H 


 


VBG pCO2   31.8 L


 


VBG HCO3   15.7 L














- EKG Interpretation by Me


Additional EKG results interpreted by me: 


EKG shows sinus tachycardia at a rate of 119, QTC of 439, normal axis.  No T 

wave inversions or ST segment changes in consecutive leads.








Discharge





- Discharge


Clinical Impression: 


 COPD exacerbation, Hypoxia, Wheezing, Tobacco abuse





Condition: Stable


Disposition: ADMITTED AS OBSERVATION


Admitting Provider: Raji (Hospitalist)


Unit Admitted: Telemetry

## 2020-01-05 NOTE — EKG REPORT
SEVERITY:- BORDERLINE ECG -

SINUS TACHYCARDIA

LOW VOLTAGE IN FRONTAL LEADS

LA ENLARGEMENT.

:

Confirmed by: Atul Herbert MD 05-Jan-2020 23:03:59

## 2020-01-06 RX ADMIN — LEVALBUTEROL HYDROCHLORIDE SCH MG: 1.25 SOLUTION RESPIRATORY (INHALATION) at 15:03

## 2020-01-06 RX ADMIN — IPRATROPIUM BROMIDE SCH MG: 0.5 SOLUTION RESPIRATORY (INHALATION) at 19:45

## 2020-01-06 RX ADMIN — GUAIFENESIN SCH MG: 600 TABLET, EXTENDED RELEASE ORAL at 10:14

## 2020-01-06 RX ADMIN — CHLORPHENIRAMINE MALEATE SCH MG: 4 TABLET ORAL at 08:46

## 2020-01-06 RX ADMIN — CHLORPHENIRAMINE MALEATE SCH MG: 4 TABLET ORAL at 21:44

## 2020-01-06 RX ADMIN — HEPARIN SODIUM SCH UNIT: 5000 INJECTION, SOLUTION INTRAVENOUS; SUBCUTANEOUS at 06:30

## 2020-01-06 RX ADMIN — DILTIAZEM HYDROCHLORIDE SCH MG: 30 TABLET, FILM COATED ORAL at 06:30

## 2020-01-06 RX ADMIN — CHLORPHENIRAMINE MALEATE SCH MG: 4 TABLET ORAL at 14:40

## 2020-01-06 RX ADMIN — HEPARIN SODIUM SCH UNIT: 5000 INJECTION, SOLUTION INTRAVENOUS; SUBCUTANEOUS at 21:44

## 2020-01-06 RX ADMIN — METHYLPREDNISOLONE SODIUM SUCCINATE SCH MG: 40 INJECTION, POWDER, FOR SOLUTION INTRAMUSCULAR; INTRAVENOUS at 10:15

## 2020-01-06 RX ADMIN — Medication SCH ML: at 13:13

## 2020-01-06 RX ADMIN — IPRATROPIUM BROMIDE SCH MG: 0.5 SOLUTION RESPIRATORY (INHALATION) at 08:31

## 2020-01-06 RX ADMIN — METHYLPREDNISOLONE SODIUM SUCCINATE SCH MG: 40 INJECTION, POWDER, FOR SOLUTION INTRAMUSCULAR; INTRAVENOUS at 21:44

## 2020-01-06 RX ADMIN — Medication SCH ML: at 06:35

## 2020-01-06 RX ADMIN — LEVALBUTEROL HYDROCHLORIDE SCH MG: 1.25 SOLUTION RESPIRATORY (INHALATION) at 19:45

## 2020-01-06 RX ADMIN — AZITHROMYCIN MONOHYDRATE SCH MLS/HR: 500 INJECTION, POWDER, LYOPHILIZED, FOR SOLUTION INTRAVENOUS at 10:18

## 2020-01-06 RX ADMIN — FLUTICASONE PROPIONATE SCH SPR: 50 SPRAY, METERED NASAL at 21:45

## 2020-01-06 RX ADMIN — DILTIAZEM HYDROCHLORIDE SCH MG: 30 TABLET, FILM COATED ORAL at 13:08

## 2020-01-06 RX ADMIN — HEPARIN SODIUM SCH UNIT: 5000 INJECTION, SOLUTION INTRAVENOUS; SUBCUTANEOUS at 13:04

## 2020-01-06 RX ADMIN — GUAIFENESIN SCH MG: 600 TABLET, EXTENDED RELEASE ORAL at 21:44

## 2020-01-06 RX ADMIN — FLUTICASONE PROPIONATE SCH SPR: 50 SPRAY, METERED NASAL at 10:13

## 2020-01-06 RX ADMIN — Medication SCH ML: at 21:44

## 2020-01-06 RX ADMIN — IPRATROPIUM BROMIDE SCH MG: 0.5 SOLUTION RESPIRATORY (INHALATION) at 15:03

## 2020-01-06 RX ADMIN — LEVALBUTEROL HYDROCHLORIDE SCH MG: 1.25 SOLUTION RESPIRATORY (INHALATION) at 08:31

## 2020-01-06 RX ADMIN — CHLORPHENIRAMINE MALEATE SCH MG: 4 TABLET ORAL at 03:30

## 2020-01-06 RX ADMIN — DILTIAZEM HYDROCHLORIDE SCH MG: 30 TABLET, FILM COATED ORAL at 17:38

## 2020-01-06 NOTE — PDOC H&P
History of Present Illness


Admission Date/PCP: 


  20 02:39





  [facility]





Patient complains of: Shortness of breath


History of Present Illness: 


EWELINA HAND is a 64 year old female with a past medical history of 

prednisone-dependent COPD, atrial fibrillation and tobacco dependence.  She 

presents with shortness of breath and a nonproductive cough unresponsive to home

nebulizer prompting evaluation in the emergency room.  She is found to have 

wheeze, use of a sensory muscles, tachypnea, tachycardia and hypoxia of 88% on 

room air.  She denies fever chills nausea vomiting or chest pain.  She started 

on empiric antibiotics, steroids, albuterol and Atrovent and referred to the 

hospitalist for admission.





Past Medical History


Cardiac Medical History: Reports: Atrial Fibrillation, Myocardial Infarction


   Denies: Coronary Artery Disease, Hypertension, Pulmonary Embolism


Pulmonary Medical History: Reports: Asthma, Chronic Obstructive Pulmonary 

Disease (COPD), Pneumonia


   Denies: Bronchitis, Respiratory Failure, Sleep Apnea, Tuberculosis


Neurological Medical History: 


   Denies: Seizures


Endocrine Medical History: 


Renal/ Medical History: 


   Denies: End Stage Renal Disease


Malignancy Medical History: Reports: Colorectal Cancer


   Denies: Breast Cancer, Cervical Cancer, Lung Cancer, Ovarian Cancer


GI Medical History: Reports: Gastroesophageal Reflux Disease


   Denies: Crohn's Disease, Hiatal Hernia


Musculoskeltal Medical History: Reports: Arthritis


   Denies: Fibromyalgia


Psychiatric Medical History: Reports: Depression, Tobacco Dependency


   Denies: Bipolar Disorder, Post Traumatic Stress Disorder


Hematology: 


   Denies: Anemia





Past Surgical History


Past Surgical History: Reports: Cholecystectomy, Hysterectomy, Tubal Ligation, 

Other - Bowel resection for colon cancer


   Denies: Appendectomy,  Section, Colostomy, Coronary Artery Bypass 

Graft, Gastric Bypass Surgery, Herniorrhaphy, Mastectomy, Pacemaker, 

Tonsillectomy





Social History


Information Source: Patient


Smoking Status: Current Every Day Smoker


Frequency of Alcohol Use: None


Hx Recreational Drug Use: No


Drugs: None


Hx Prescription Drug Abuse: No





- Advance Directive


Resuscitation Status: Full Code





Family History


Family History: Hypertension


Parental Family History Reviewed: Yes


Children Family History Reviewed: Yes


Sibling(s) Family History Reviewed.: Yes





Medication/Allergy


Home Medications: 








Acetaminophen [Tylenol 325 mg Tablet] 650 mg PO Q4HP PRN  tablet 11/10/19 


Albuterol Sulfate [Albuterol Sulfate Hfa] 1 puff IH Q6HP PRN 11/10/19 


Albuterol Sulfate [Albuterol Sulfate Hfa] 2 puff IH Q6 11/10/19 


Albuterol Sulfate [Ventolin 0.083% Neb 2.5 mg/3 mL Ampul] 3 ml NEB Q6HP PRN #120

11/10/19 


Fluticasone/Vilanterol [Breo Ellipta 200-25 Mcg INH] 1 puff IH DAILY 11/10/19 


Ipratropium/Albuterol Sulfate [Duoneb 3 ml Ampul] 3 ml NEB [id number] PRN #60 

vial.neb 11/10/19 


Lansoprazole [Prevacid] 30 mg PO BID 11/10/19 


Montelukast Sodium [Singulair 10 mg Tablet] 10 mg PO QHS #30 tablet 11/10/19 


Nicotine Polacrilex [Nicotine Lozenge] 2 mg PO Q2HP PRN 11/10/19 


Prednisone [Deltasone 20 mg Tablet] 20 mg PO ASDIR PRN #20 tablet 11/10/19 


Tiotropium Bromide [Spiriva Respimat] 2 puff IH DAILY 11/10/19 








Allergies/Adverse Reactions: 


                                        





No Known Allergies Allergy (Verified 19 11:11)


   











Review of Systems


Constitutional: PRESENT: as per HPI, fatigue.  ABSENT: chills, fever(s), 

headache(s), weight gain, weight loss


Eyes: ABSENT: visual disturbances


Ears: ABSENT: hearing changes


Cardiovascular: ABSENT: chest pain, dyspnea on exertion, edema, orthropnea, 

palpitations


Respiratory: PRESENT: as per HPI, cough, dyspnea.  ABSENT: hemoptysis, sputum


Gastrointestinal: ABSENT: abdominal pain, constipation, diarrhea, hematemesis, 

hematochezia, nausea, vomiting


Genitourinary: ABSENT: dysuria, hematuria


Musculoskeletal: ABSENT: joint swelling


Integumentary: ABSENT: rash, wounds


Neurological: ABSENT: abnormal gait, abnormal speech, confusion, dizziness, 

focal weakness, syncope


Psychiatric: ABSENT: anxiety, depression, homidical ideation, suicidal ideation


Endocrine: ABSENT: cold intolerance, heat intolerance, polydipsia, polyuria


Hematologic/Lymphatic: ABSENT: easy bleeding, easy bruising





Physical Exam


Vital Signs: 


                                        











Temp Pulse Resp BP Pulse Ox


 


 97.8 F   112 H  22 H  140/101 H  99 


 


 20 03:31  20 23:33  20 03:31  20 03:31  20 03:31








                                 Intake & Output











 20





 11:59 11:59 11:59


 


Intake Total   1200


 


Balance   1200


 


Weight   68.039 kg











General appearance: PRESENT: cooperative, mild distress, well-developed, well-

nourished


Head exam: PRESENT: atraumatic, normocephalic


Eye exam: PRESENT: conjunctiva pink, EOMI, PERRLA.  ABSENT: scleral icterus


Ear exam: PRESENT: normal external ear exam


Mouth exam: PRESENT: moist, tongue midline


Neck exam: ABSENT: carotid bruit, JVD, lymphadenopathy, thyromegaly


Respiratory exam: PRESENT: accessory muscle use, crackles, prolonged expiratory 

phas, retraction, symmetrical, tachypnea, wheezes


Cardiovascular exam: PRESENT: tachycardia.  ABSENT: diastolic murmur, rubs, 

systolic murmur


Pulses: PRESENT: normal dorsalis pedis pul


Vascular exam: PRESENT: normal capillary refill


GI/Abdominal exam: PRESENT: normal bowel sounds, soft.  ABSENT: distended, 

guarding, mass, organolmegaly, rebound, tenderness


Rectal exam: PRESENT: deferred


Extremities exam: PRESENT: full ROM.  ABSENT: calf tenderness, clubbing, pedal 

edema


Neurological exam: PRESENT: alert, awake, oriented to person, oriented to place,

oriented to time, oriented to situation, CN II-XII grossly intact.  ABSENT: 

motor sensory deficit


Psychiatric exam: PRESENT: appropriate affect, normal mood.  ABSENT: homicidal 

ideation, suicidal ideation


Skin exam: PRESENT: dry, intact, warm.  ABSENT: cyanosis, rash





Results


Laboratory Results: 


                                        





                                 20 22:16 





                                 20 22:16 





                                        











  20





  22:16 22:16 23:00


 


WBC  8.3  


 


RBC  5.23  


 


Hgb  16.1 H  


 


Hct  48.5 H  


 


MCV  93  


 


MCH  30.8  


 


MCHC  33.2  


 


RDW  13.7  


 


Plt Count  335  


 


Seg Neutrophils %  47.7  


 


VBG pH    7.31


 


VBG pCO2    31.8 L


 


VBG HCO3    15.7 L


 


VBG Base Excess    -9.2


 


Sodium   139.8 


 


Potassium   4.5 


 


Chloride   105 


 


Carbon Dioxide   25 


 


Anion Gap   10 


 


BUN   9 


 


Creatinine   0.82 


 


Est GFR ( Amer)   > 60 


 


Glucose   107 


 


Calcium   9.8 


 


Total Bilirubin   0.3 


 


AST   28 


 


Alkaline Phosphatase   109 


 


Total Protein   7.5 


 


Albumin   4.2 








                                        











  20





  22:16 22:20


 


Creatine Kinase  82 


 


CK-MB (CK-2)   0.61


 


Troponin I   < 0.012











Impressions: 


                                        





Chest X-Ray  20 22:31


IMPRESSION: No active disease. 


 














Assessment and Plan





- Diagnosis


(1) Acute bronchitis


Is this a current diagnosis for this admission?: Yes   


Plan: 


Supplemental oxygen, albuterol, Atrovent, prednisone, empiric antibiotic, 

flutter valve and incentive spirometry








(2) COPD exacerbation


Is this a current diagnosis for this admission?: Yes   


Plan: 


Secondary to #1








(3) Tobacco abuse


Is this a current diagnosis for this admission?: Yes   


Plan: 


Cessation counseling, declines nicotine replacement options








(4) GERD (gastroesophageal reflux disease)


Is this a current diagnosis for this admission?: Yes   


Plan: 


Prevacid twice daily








- Time


Time Spent with patient: 25-34 minutes





- Inpatient Certification


Medical Necessity: Need Close Monitoring Due to Risk of Patient Decompensation

## 2020-01-07 RX ADMIN — METHYLPREDNISOLONE SODIUM SUCCINATE SCH MG: 40 INJECTION, POWDER, FOR SOLUTION INTRAMUSCULAR; INTRAVENOUS at 22:17

## 2020-01-07 RX ADMIN — HEPARIN SODIUM SCH: 5000 INJECTION, SOLUTION INTRAVENOUS; SUBCUTANEOUS at 05:23

## 2020-01-07 RX ADMIN — GUAIFENESIN SCH MG: 600 TABLET, EXTENDED RELEASE ORAL at 10:29

## 2020-01-07 RX ADMIN — METHYLPREDNISOLONE SODIUM SUCCINATE SCH MG: 40 INJECTION, POWDER, FOR SOLUTION INTRAMUSCULAR; INTRAVENOUS at 10:29

## 2020-01-07 RX ADMIN — AZITHROMYCIN MONOHYDRATE SCH MLS/HR: 500 INJECTION, POWDER, LYOPHILIZED, FOR SOLUTION INTRAVENOUS at 10:29

## 2020-01-07 RX ADMIN — IPRATROPIUM BROMIDE SCH MG: 0.5 SOLUTION RESPIRATORY (INHALATION) at 09:12

## 2020-01-07 RX ADMIN — FLUTICASONE PROPIONATE SCH: 50 SPRAY, METERED NASAL at 22:25

## 2020-01-07 RX ADMIN — FLUTICASONE PROPIONATE SCH SPR: 50 SPRAY, METERED NASAL at 10:30

## 2020-01-07 RX ADMIN — Medication SCH: at 13:52

## 2020-01-07 RX ADMIN — FLUTICASONE FUROATE, UMECLIDINIUM BROMIDE AND VILANTEROL TRIFENATATE SCH INH: 100; 62.5; 25 POWDER RESPIRATORY (INHALATION) at 17:42

## 2020-01-07 RX ADMIN — Medication SCH: at 05:23

## 2020-01-07 RX ADMIN — LEVALBUTEROL HYDROCHLORIDE SCH MG: 1.25 SOLUTION RESPIRATORY (INHALATION) at 09:12

## 2020-01-07 RX ADMIN — Medication SCH: at 22:25

## 2020-01-07 RX ADMIN — LEVALBUTEROL HYDROCHLORIDE SCH MG: 1.25 SOLUTION RESPIRATORY (INHALATION) at 14:15

## 2020-01-07 RX ADMIN — HEPARIN SODIUM SCH UNIT: 5000 INJECTION, SOLUTION INTRAVENOUS; SUBCUTANEOUS at 22:21

## 2020-01-07 RX ADMIN — DILTIAZEM HYDROCHLORIDE SCH MG: 30 TABLET, FILM COATED ORAL at 00:25

## 2020-01-07 RX ADMIN — GUAIFENESIN SCH MG: 600 TABLET, EXTENDED RELEASE ORAL at 22:17

## 2020-01-07 RX ADMIN — DILTIAZEM HYDROCHLORIDE SCH MG: 30 TABLET, FILM COATED ORAL at 17:43

## 2020-01-07 RX ADMIN — DILTIAZEM HYDROCHLORIDE SCH MG: 30 TABLET, FILM COATED ORAL at 05:12

## 2020-01-07 RX ADMIN — IPRATROPIUM BROMIDE SCH MG: 0.5 SOLUTION RESPIRATORY (INHALATION) at 20:46

## 2020-01-07 RX ADMIN — HEPARIN SODIUM SCH UNIT: 5000 INJECTION, SOLUTION INTRAVENOUS; SUBCUTANEOUS at 13:58

## 2020-01-07 RX ADMIN — IPRATROPIUM BROMIDE SCH MG: 0.5 SOLUTION RESPIRATORY (INHALATION) at 02:51

## 2020-01-07 RX ADMIN — LEVALBUTEROL HYDROCHLORIDE SCH MG: 1.25 SOLUTION RESPIRATORY (INHALATION) at 02:51

## 2020-01-07 RX ADMIN — DILTIAZEM HYDROCHLORIDE SCH MG: 30 TABLET, FILM COATED ORAL at 13:58

## 2020-01-07 RX ADMIN — IPRATROPIUM BROMIDE SCH MG: 0.5 SOLUTION RESPIRATORY (INHALATION) at 14:15

## 2020-01-07 RX ADMIN — LEVALBUTEROL HYDROCHLORIDE SCH MG: 1.25 SOLUTION RESPIRATORY (INHALATION) at 20:46

## 2020-01-07 NOTE — PDOC PROGRESS REPORT
Subjective


Progress Note for:: 01/07/20


Subjective:: 





Patient would like to go home and take care of her son who has Down syndrome.  

However she still endorses significant shortness of breath at this time.  Denies

any chest pain nausea or vomiting.  Denies any fevers.


Reason For Visit: 


COPD EXACERBATION,HYPOXIA,TACHYCARDIA








Physical Exam


Vital Signs: 


                                        











Temp Pulse Resp BP Pulse Ox


 


 98.2 F   99   17   101/58 L  92 


 


 01/07/20 07:31  01/07/20 09:12  01/07/20 09:12  01/07/20 07:31  01/07/20 09:12








                                 Intake & Output











 01/06/20 01/07/20 01/08/20





 06:59 06:59 06:59


 


Intake Total 1200 250 


 


Output Total  300 


 


Balance 1200 -50 


 


Weight 68.039 kg 74.5 kg 











General appearance: PRESENT: cooperative, other - Still quite short of breath 

after sitting up and talking for a while


Neck exam: ABSENT: JVD


Respiratory exam: PRESENT: symmetrical, tachypnea, wheezes.  ABSENT: crackles, 

rhonchi, unlabored


Cardiovascular exam: PRESENT: RRR, +S1, +S2.  ABSENT: tachycardia


GI/Abdominal exam: PRESENT: normal bowel sounds, soft.  ABSENT: rebound, rigid, 

tenderness


Neurological exam: PRESENT: alert, awake, oriented to person, oriented to place,

oriented to time, oriented to situation





Results


Laboratory Results: 


                                        





                                 01/05/20 22:16 





                                 01/05/20 22:16 





                                        











  01/05/20 01/05/20





  22:16 22:20


 


Creatine Kinase  82 


 


CK-MB (CK-2)   0.61


 


Troponin I   < 0.012











Impressions: 


                                        





Chest X-Ray  01/05/20 22:31


IMPRESSION: No active disease. 


 














Assessment and Plan





- Diagnosis


(1) COPD exacerbation


Is this a current diagnosis for this admission?: Yes   


Plan: 


Patient still quite short of breath today and as such I have told her that she 

will not be able to be discharged today.


Continue frequent nebulizer treatments, steroids, azithromycin, LABA/LAMA/ICS


Will also have patient get up and ambulate and see how she does








(2) Acute bronchitis


Qualifiers: 


   Bronchitis organism: unspecified organism   Qualified Code(s): J20.9 - Acute 

bronchitis, unspecified   


Is this a current diagnosis for this admission?: Yes   


Plan: 


Supportive care, incentive spirometer








(3) Tobacco abuse


Is this a current diagnosis for this admission?: Yes   


Plan: 


Cessation counseling, nicotine patch and lozenges








(4) GERD (gastroesophageal reflux disease)


Qualifiers: 


   Esophagitis presence: without esophagitis   Qualified Code(s): K21.9 - 

Gastro-esophageal reflux disease without esophagitis   


Is this a current diagnosis for this admission?: Yes   


Plan: 


Protonix








- Time


Time Spent with patient: 15-24 minutes

## 2020-01-08 VITALS — DIASTOLIC BLOOD PRESSURE: 83 MMHG | SYSTOLIC BLOOD PRESSURE: 130 MMHG

## 2020-01-08 RX ADMIN — FLUTICASONE PROPIONATE SCH SPR: 50 SPRAY, METERED NASAL at 11:11

## 2020-01-08 RX ADMIN — IPRATROPIUM BROMIDE SCH MG: 0.5 SOLUTION RESPIRATORY (INHALATION) at 02:22

## 2020-01-08 RX ADMIN — Medication SCH ML: at 05:52

## 2020-01-08 RX ADMIN — AZITHROMYCIN MONOHYDRATE SCH MLS/HR: 500 INJECTION, POWDER, LYOPHILIZED, FOR SOLUTION INTRAVENOUS at 11:10

## 2020-01-08 RX ADMIN — GUAIFENESIN SCH MG: 600 TABLET, EXTENDED RELEASE ORAL at 11:08

## 2020-01-08 RX ADMIN — LEVALBUTEROL HYDROCHLORIDE SCH MG: 1.25 SOLUTION RESPIRATORY (INHALATION) at 08:14

## 2020-01-08 RX ADMIN — HEPARIN SODIUM SCH UNIT: 5000 INJECTION, SOLUTION INTRAVENOUS; SUBCUTANEOUS at 05:51

## 2020-01-08 RX ADMIN — DILTIAZEM HYDROCHLORIDE SCH MG: 30 TABLET, FILM COATED ORAL at 11:08

## 2020-01-08 RX ADMIN — METHYLPREDNISOLONE SODIUM SUCCINATE SCH MG: 40 INJECTION, POWDER, FOR SOLUTION INTRAMUSCULAR; INTRAVENOUS at 11:10

## 2020-01-08 RX ADMIN — IPRATROPIUM BROMIDE SCH MG: 0.5 SOLUTION RESPIRATORY (INHALATION) at 08:14

## 2020-01-08 RX ADMIN — LEVALBUTEROL HYDROCHLORIDE SCH MG: 1.25 SOLUTION RESPIRATORY (INHALATION) at 02:22

## 2020-01-08 RX ADMIN — DILTIAZEM HYDROCHLORIDE SCH MG: 30 TABLET, FILM COATED ORAL at 00:38

## 2020-01-08 RX ADMIN — DILTIAZEM HYDROCHLORIDE SCH MG: 30 TABLET, FILM COATED ORAL at 05:51

## 2020-01-08 RX ADMIN — FLUTICASONE FUROATE, UMECLIDINIUM BROMIDE AND VILANTEROL TRIFENATATE SCH INH: 100; 62.5; 25 POWDER RESPIRATORY (INHALATION) at 11:13

## 2020-01-09 NOTE — PDOC DISCHARGE SUMMARY
Impression





- Admit/DC Date/PCP


Admission Date/Primary Care Provider: 


  01/06/20 02:39





  THOMPSON BEARD





Discharge Date: 01/09/20





- Discharge Diagnosis


(1) Acute exacerbation of COPD with asthma


Is this a current diagnosis for this admission?: Yes   





(2) Acute respiratory failure with hypoxia


Is this a current diagnosis for this admission?: Yes   





(3) Acute bronchitis


Is this a current diagnosis for this admission?: Yes   





(4) GERD (gastroesophageal reflux disease)


Is this a current diagnosis for this admission?: Yes   





(5) Tobacco abuse


Is this a current diagnosis for this admission?: Yes   





- Additional Information


Resuscitation Status: Full Code


Discharge Diet: Regular


Discharge Activity: Activity As Tolerated


Referrals: 


BRANDY PARRISH MD [ACTIVE STAFF] - 01/15/20 10:15 am


SHEELA MONROE FNP-C [Primary Care Provider] - 01/22/20 3:15 pm


Prescriptions: 


Azithromycin 250 mg PO DAILY 2 Days #2 tablet


Prednisone 50 mg PO DAILY 4 Days #4 tablet


Home Medications: 








Albuterol Sulfate [Albuterol Sulfate Hfa] 1 puff IH Q6HP PRN 11/10/19 


Fluticasone/Vilanterol [Breo Ellipta 200-25 Mcg INH] 1 puff IH DAILY 11/10/19 


Lansoprazole [Prevacid] 30 mg PO DAILY 11/10/19 


Nicotine Polacrilex [Nicotine Lozenge] 2 mg PO Q2HP PRN 11/10/19 


Tiotropium Bromide [Spiriva Respimat] 2 puff IH DAILY 11/10/19 


Fluticasone Propionate [Xhance] 1 spray NS BID 01/06/20 


Azithromycin 250 mg PO DAILY 2 Days #2 tablet 01/08/20 


Prednisone 50 mg PO DAILY 4 Days #4 tablet 01/08/20 











History of Present Illiness


History of Present Illness: 


EWELINA HAND is a 64 year old female with a past medical history of 

prednisone-dependent COPD, atrial fibrillation and tobacco dependence.  She 

presents with shortness of breath and a nonproductive cough unresponsive to home

 nebulizer prompting evaluation in the emergency room.  She is found to have 

wheeze, use of a sensory muscles, tachypnea, tachycardia and hypoxia of 88% on 

room air.  She denies fever chills nausea vomiting or chest pain.  She started 

on empiric antibiotics, steroids, albuterol and Atrovent and referred to the 

hospitalist for admission.








Hospital Course


Hospital Course: 


(1) Acute exacerbation of COPD with asthma


Presented with hypoxia tachycardia tachypnea


Was admitted to telemetry and started on nebs, IV steroids, azithromycin, LABA, 

LABA, and ICS.


Significant improvement of symptoms.  SPO2 WNL on RA.


Discharged home on p.o. azithromycin to complete total of 4 days.  P.o. 

prednisone to complete total of 5 days.


Patient is stating that  of COPD with asthma used to be on management but missed

 her last dose yesterday insurance problems.


Patient is stating that he is scheduled to receive her omalizumab as outpatient.





(2) Acute respiratory failure with hypoxia


Plan as per #1.





(3) Acute bronchitis


Supportive care, incentive spirometer





(4) Tobacco abuse


Cessation counseling, nicotine patch and lozenges





(5) GERD (gastroesophageal reflux disease)


Restarted on home meds.  Patient PCP and gastroenterology follow-up recommended.

 





Physical Exam


Vital Signs: 


                                        











Temp Pulse Resp BP Pulse Ox


 


 97.5 F   110 H  18   130/83 H  97 


 


 01/08/20 13:10  01/08/20 13:10  01/08/20 13:10  01/08/20 13:10  01/08/20 13:10








                                 Intake & Output











 01/08/20 01/09/20 01/10/20





 06:59 06:59 06:59


 


Intake Total 1750 650 


 


Balance 1750 650 


 


Weight 73.9 kg  











General appearance: PRESENT: no acute distress, well-developed, well-nourished


Head exam: PRESENT: atraumatic, normocephalic


Respiratory exam: PRESENT: clear to auscultation taniya.  ABSENT: rales, rhonchi, 

wheezes


Cardiovascular exam: PRESENT: RRR.  ABSENT: diastolic murmur, rubs, systolic 

murmur


GI/Abdominal exam: PRESENT: normal bowel sounds, soft.  ABSENT: distended, 

guarding, mass, organolmegaly, rebound, tenderness


Neurological exam: PRESENT: alert, awake, oriented to person, oriented to place,

 oriented to time, oriented to situation, CN II-XII grossly intact.  ABSENT: 

motor sensory deficit





Results


Laboratory Results: 


                                        











WBC  8.3 10^3/uL (4.0-10.5)   01/05/20  22:16    


 


RBC  5.23 10^6/uL (3.72-5.28)   01/05/20  22:16    


 


Hgb  16.1 g/dL (12.0-15.5)  H  01/05/20  22:16    


 


Hct  48.5 % (36.0-47.0)  H  01/05/20  22:16    


 


MCV  93 fl (80-97)   01/05/20  22:16    


 


MCH  30.8 pg (27.0-33.4)   01/05/20  22:16    


 


MCHC  33.2 g/dL (32.0-36.0)   01/05/20  22:16    


 


RDW  13.7 % (11.5-14.0)   01/05/20  22:16    


 


Plt Count  335 10^3/uL (150-450)   01/05/20  22:16    


 


Lymph % (Auto)  35.3 % (13-45)   01/05/20  22:16    


 


Mono % (Auto)  12.6 % (3-13)   01/05/20  22:16    


 


Eos % (Auto)  3.5 % (0-6)   01/05/20  22:16    


 


Baso % (Auto)  0.9 % (0-2)   01/05/20  22:16    


 


Absolute Neuts (auto)  3.9 10^3/uL (1.7-8.2)   01/05/20  22:16    


 


Absolute Lymphs (auto)  2.9 10^3/uL (0.5-4.7)   01/05/20  22:16    


 


Absolute Monos (auto)  1.0 10^3/uL (0.1-1.4)   01/05/20  22:16    


 


Absolute Eos (auto)  0.3 10^3/uL (0.0-0.6)   01/05/20  22:16    


 


Absolute Basos (auto)  0.1 10^3/uL (0.0-0.2)   01/05/20  22:16    


 


Seg Neutrophils %  47.7 % (42-78)   01/05/20  22:16    


 


VBG pH  7.31  (7.30-7.42)   01/05/20  23:00    


 


VBG pCO2  31.8 mmHg (35-63)  L  01/05/20  23:00    


 


VBG HCO3  15.7 mmol/L (20-32)  L  01/05/20  23:00    


 


VBG Base Excess  -9.2 mmol/L  01/05/20  23:00    


 


Sodium  139.8 mmol/L (137-145)   01/05/20  22:16    


 


Potassium  4.5 mmol/L (3.6-5.0)   01/05/20  22:16    


 


Chloride  105 mmol/L ()   01/05/20  22:16    


 


Carbon Dioxide  25 mmol/L (22-30)   01/05/20  22:16    


 


Anion Gap  10  (5-19)   01/05/20  22:16    


 


BUN  9 mg/dL (7-20)   01/05/20  22:16    


 


Creatinine  0.82 mg/dL (0.52-1.25)   01/05/20  22:16    


 


Est GFR ( Amer)  > 60  (>60)   01/05/20  22:16    


 


Est GFR (MDRD) Non-Af  > 60  (>60)   01/05/20  22:16    


 


Glucose  107 mg/dL ()   01/05/20  22:16    


 


Calcium  9.8 mg/dL (8.4-10.2)   01/05/20  22:16    


 


Total Bilirubin  0.3 mg/dL (0.2-1.3)   01/05/20  22:16    


 


Direct Bilirubin  0.3 mg/dL (0.0-0.4)   01/05/20  22:16    


 


Neonat Total Bilirubin  Not Reportable   01/05/20  22:16    


 


Neonat Direct Bilirubin  Not Reportable   01/05/20  22:16    


 


Neonat Indirect Bili  Not Reportable   01/05/20  22:16    


 


AST  28 U/L (14-36)   01/05/20  22:16    


 


ALT  16 U/L (<35)   01/05/20  22:16    


 


Alkaline Phosphatase  109 U/L ()   01/05/20  22:16    


 


Creatine Kinase  82 U/L ()   01/05/20  22:16    


 


CK-MB (CK-2)  0.61 ng/mL (<4.55)   01/05/20  22:20    


 


Troponin I  < 0.012 ng/mL  01/05/20  22:20    


 


Total Protein  7.5 g/dL (6.3-8.2)   01/05/20  22:16    


 


Albumin  4.2 g/dL (3.5-5.0)   01/05/20  22:16    








                                        











  01/05/20





  22:20


 


CK-MB (CK-2)  0.61


 


Troponin I  < 0.012











Impressions: 


                                        





Chest X-Ray  01/05/20 22:31


IMPRESSION: No active disease. 


 














Stroke


Is this a Stroke Patient?: No





Acute Heart Failure





- **


Is this a Heart Failure Patient?: No

## 2020-01-13 ENCOUNTER — HOSPITAL ENCOUNTER (OUTPATIENT)
Dept: HOSPITAL 62 - RAD | Age: 65
End: 2020-01-13
Attending: NURSE PRACTITIONER
Payer: COMMERCIAL

## 2020-01-13 DIAGNOSIS — J43.9: Primary | ICD-10-CM

## 2020-01-13 DIAGNOSIS — R91.8: ICD-10-CM

## 2020-01-13 PROCEDURE — 71250 CT THORAX DX C-: CPT

## 2020-01-13 NOTE — RADIOLOGY REPORT (SQ)
EXAM DESCRIPTION:  CT CHEST WITHOUT



COMPLETED DATE/TIME:  1/13/2020 10:44 am



REASON FOR STUDY:  PULMONARY NODULES R91.8  OTHER NONSPECIFIC ABNORMAL FINDING OF LUNG FIELD



COMPARISON:  12/11/2018



TECHNIQUE:  CT scan performed of the chest without intravenous contrast.  Images reviewed with lung, 
soft tissue and bone windows.  Reconstructed coronal and sagittal MPR images reviewed.  All images st
ored on PACS.

All CT scanners at this facility use dose modulation, iterative reconstruction, and/or weight based d
osing when appropriate to reduce radiation dose to as low as reasonably achievable (ALARA).

[facility]: Dose Right  [facility]: CareDose    [facility]: Dose Right    [facility]: Teradose 4D    [facility]: Smart Technologies



RADIATION DOSE:  CT Rad equipment meets quality standard of care and radiation dose reduction techniq
ues were employed. CTDIvol: 10.0 mGy. DLP: 396 mGy-cm. mGy.



LIMITATIONS:  No technical limitations.



FINDINGS:  LUNGS AND PLEURA: Mild bilateral emphysematous changes.  No suspicious pulmonary nodules. 
 No consolidation.  No effusions.

HILAR AND MEDIASTINAL STRUCTURES: No identified masses or abnormal nodes.  No obvious aneurysm.

HEART AND VASCULAR STRUCTURES: No aneurysm.  No pericardial effusion.

UPPER ABDOMEN: No significant findings.  Limited exam.

THYROID AND OTHER SOFT TISSUES: No masses.  No adenopathy.

BONES: No significant finding.

HARDWARE: None in the chest.

OTHER: No other significant findings.



IMPRESSION:  Mild bilateral emphysematous changes.  No suspicious findings which warrant follow-up.



TECHNICAL DOCUMENTATION:  JOB ID:  [id number]

Quality ID # 436: Final reports with documentation of one or more dose reduction techniques (e.g., Au
tomated exposure control, adjustment of the mA and/or kV according to patient size, use of iterative 
reconstruction technique)

 2011 [employer]- All Rights Reserved



Reading location - IP/workstation name: [email or web address]

## 2020-05-05 ENCOUNTER — HOSPITAL ENCOUNTER (OUTPATIENT)
Dept: HOSPITAL 62 - OD | Age: 65
End: 2020-05-05
Attending: CLINIC/CENTER
Payer: COMMERCIAL

## 2020-05-05 DIAGNOSIS — J44.9: Primary | ICD-10-CM

## 2020-05-05 DIAGNOSIS — Z72.0: ICD-10-CM

## 2020-05-05 DIAGNOSIS — R05: ICD-10-CM

## 2020-05-05 DIAGNOSIS — Z79.899: ICD-10-CM

## 2020-05-05 DIAGNOSIS — J06.9: ICD-10-CM

## 2020-05-05 LAB
ADD MANUAL DIFF: NO
ALBUMIN SERPL-MCNC: 3.6 G/DL (ref 3.5–5)
ALP SERPL-CCNC: 94 U/L (ref 38–126)
ANION GAP SERPL CALC-SCNC: 11 MMOL/L (ref 5–19)
ARTERIAL BLOOD FIO2: (no result)
ARTERIAL BLOOD H2CO3: 0.88 MMOL/L (ref 1.05–1.35)
ARTERIAL BLOOD HCO3: 19.2 MMOL/L (ref 20–24)
ARTERIAL BLOOD PCO2: 29.2 MMHG (ref 35–45)
ARTERIAL BLOOD PH: 7.44 (ref 7.35–7.45)
ARTERIAL BLOOD PO2: 65 MMHG (ref 80–100)
ARTERIAL BLOOD TOTAL CO2: 20.1 MMOL/L (ref 21–25)
AST SERPL-CCNC: 21 U/L (ref 14–36)
BASE EXCESS BLDA CALC-SCNC: -3.6 MMOL/L
BASOPHILS # BLD AUTO: 0.1 10^3/UL (ref 0–0.2)
BASOPHILS NFR BLD AUTO: 0.7 % (ref 0–2)
BILIRUB DIRECT SERPL-MCNC: 0.1 MG/DL (ref 0–0.4)
BILIRUB SERPL-MCNC: 0.5 MG/DL (ref 0.2–1.3)
BUN SERPL-MCNC: 17 MG/DL (ref 7–20)
CALCIUM: 9.7 MG/DL (ref 8.4–10.2)
CHLORIDE SERPL-SCNC: 99 MMOL/L (ref 98–107)
CO2 SERPL-SCNC: 24 MMOL/L (ref 22–30)
EOSINOPHIL # BLD AUTO: 0 10^3/UL (ref 0–0.6)
EOSINOPHIL NFR BLD AUTO: 0.6 % (ref 0–6)
ERYTHROCYTE [DISTWIDTH] IN BLOOD BY AUTOMATED COUNT: 13.8 % (ref 11.5–14)
GLUCOSE SERPL-MCNC: 134 MG/DL (ref 75–110)
HCT VFR BLD CALC: 42 % (ref 36–47)
HGB BLD-MCNC: 14.6 G/DL (ref 12–15.5)
LYMPHOCYTES # BLD AUTO: 0.6 10^3/UL (ref 0.5–4.7)
LYMPHOCYTES NFR BLD AUTO: 8.4 % (ref 13–45)
MCH RBC QN AUTO: 31.5 PG (ref 27–33.4)
MCHC RBC AUTO-ENTMCNC: 34.8 G/DL (ref 32–36)
MCV RBC AUTO: 91 FL (ref 80–97)
MONOCYTES # BLD AUTO: 0.9 10^3/UL (ref 0.1–1.4)
MONOCYTES NFR BLD AUTO: 11.5 % (ref 3–13)
NEUTROPHILS # BLD AUTO: 5.8 10^3/UL (ref 1.7–8.2)
NEUTS SEG NFR BLD AUTO: 78.8 % (ref 42–78)
PLATELET # BLD: 295 10^3/UL (ref 150–450)
POTASSIUM SERPL-SCNC: 4.2 MMOL/L (ref 3.6–5)
PROT SERPL-MCNC: 6.7 G/DL (ref 6.3–8.2)
RBC # BLD AUTO: 4.64 10^6/UL (ref 3.72–5.28)
SAO2 % BLDA: 93.7 % (ref 94–98)
TOTAL CELLS COUNTED % (AUTO): 100 %
WBC # BLD AUTO: 7.4 10^3/UL (ref 4–10.5)

## 2020-05-05 PROCEDURE — 82803 BLOOD GASES ANY COMBINATION: CPT

## 2020-05-05 PROCEDURE — 36415 COLL VENOUS BLD VENIPUNCTURE: CPT

## 2020-05-05 PROCEDURE — 71046 X-RAY EXAM CHEST 2 VIEWS: CPT

## 2020-05-05 PROCEDURE — 80053 COMPREHEN METABOLIC PANEL: CPT

## 2020-05-05 PROCEDURE — 85025 COMPLETE CBC W/AUTO DIFF WBC: CPT

## 2020-05-05 NOTE — RADIOLOGY REPORT (SQ)
EXAM DESCRIPTION:  CHEST PA/LATERAL



IMAGES COMPLETED DATE/TIME:  5/5/2020 1:29 pm



REASON FOR STUDY:  COPD,,SOB,COUGH



COMPARISON:  None.



EXAM PARAMETERS:  NUMBER OF VIEWS: two views

TECHNIQUE:  PA and lateral views of the chest were obtained.

RADIATION DOSE: NA

LIMITATIONS: none



FINDINGS:  LUNGS AND PLEURA: Acute asymmetric opacities in the right middle lobe.  There is no sizabl
e pleural effusion or pneumothorax.

MEDIASTINUM AND HILAR STRUCTURES: No mediastinal or hilar contour abnormality.

HEART AND VASCULAR STRUCTURES: The cardiac silhouette and pulmonary vasculature within normal limits.


BONES: No acute findings.

HARDWARE: Suture anchor in the proximal right humerus and surgical clips that project over the gastro
esophageal junction.

OTHER: No other finding.



IMPRESSION:  Acute opacities in the right middle lobe.  Clinical correlation to exclude a pneumonia i
s recommended.



TECHNICAL DOCUMENTATION:  JOB ID:  [id number]

 2011 [employer]- All Rights Reserved



Reading location - IP/workstation name: [email or web address]

## 2020-05-08 ENCOUNTER — HOSPITAL ENCOUNTER (OUTPATIENT)
Dept: HOSPITAL 62 - RDC | Age: 65
End: 2020-05-08
Attending: NURSE PRACTITIONER
Payer: COMMERCIAL

## 2020-05-08 VITALS — SYSTOLIC BLOOD PRESSURE: 128 MMHG | DIASTOLIC BLOOD PRESSURE: 69 MMHG

## 2020-05-08 DIAGNOSIS — Z87.01: ICD-10-CM

## 2020-05-08 DIAGNOSIS — R05: ICD-10-CM

## 2020-05-08 DIAGNOSIS — Z20.828: Primary | ICD-10-CM

## 2020-05-08 DIAGNOSIS — R19.7: ICD-10-CM

## 2020-05-08 DIAGNOSIS — R06.02: ICD-10-CM

## 2020-05-08 DIAGNOSIS — R50.9: ICD-10-CM

## 2020-05-08 DIAGNOSIS — R06.2: ICD-10-CM

## 2020-05-08 DIAGNOSIS — R11.2: ICD-10-CM

## 2020-05-08 DIAGNOSIS — R51: ICD-10-CM

## 2020-05-08 DIAGNOSIS — R10.9: ICD-10-CM

## 2020-05-08 DIAGNOSIS — F17.210: ICD-10-CM

## 2020-05-08 DIAGNOSIS — J44.9: ICD-10-CM

## 2020-05-08 LAB
A TYPE INFLUENZA AG: NEGATIVE
B INFLUENZA AG: NEGATIVE

## 2020-05-08 PROCEDURE — 99211 OFF/OP EST MAY X REQ PHY/QHP: CPT

## 2020-05-08 PROCEDURE — 87070 CULTURE OTHR SPECIMN AEROBIC: CPT

## 2020-05-08 PROCEDURE — 87804 INFLUENZA ASSAY W/OPTIC: CPT

## 2020-05-08 PROCEDURE — 87880 STREP A ASSAY W/OPTIC: CPT

## 2020-05-08 PROCEDURE — 87635 SARS-COV-2 COVID-19 AMP PRB: CPT

## 2020-05-08 NOTE — ER RDC ASSESSMENT REPORT
Intake





- In the Last 14 days


Have you traveled outside [place]?: No


Have you been in close contact with someone CONFIRMED: No


Worked in Healthcare?: No





- Symptoms


Subjective Fever(Felt feverish): Yes


Chills: Yes


Muscule Aches: Yes


Runny Nose: No


Sore Throat: No


Cough (New or worsening chronic cough): Yes


--How many day(s)?: Reports coughing up yellow sputum chest diagnosed on Tuesday

with pneumonia


Shortness of breath: Yes


Nausea or Vomiting: Yes


Headache: Yes


Abdominal Pain: Yes


Diarrhea(3 or more loose stools in last 24 hours): Yes





- Do you have any of the following


Chronic lung disease: Asthma or emphysema or COPD: Yes


Chronic Lung Disease Comment: 





RePorts a history of COPD


Cystic Fibrosis: No


Diabetes: No


High Blood Pressure: No


Cardiovascular Disease: No


Chronic Kidney Disease: No


Chronic Liver Disease: No


Chronic blood disorder like Sickle Cell Disease: No


Weak immune system due to disease or medication: No


Neurologic condition that limits movement: No


Developmental delay - Moderate to Severe: No


Recent (within past 2 weeks) or current Pregnancy: No


Morbid Obesity (>100 pounds over ideal weight): No


Obesity Comment: 





Height 5 feet 7 inches weight 160 pounds





- Objective


Temperature: 97.2 F


Pulse Rate: 110


Respiratory Rate: 20


Blood Pressure: 128/69


O2 Sat by Pulse Oximetry: 93


Objective: 


Given above, testing performed: 
































If Testing Performed:


Test Specimen Type Sent to











General





- General


Information source: Patient


Notes: 





Patient here today at [facility] for Aida testing started feeling sick on May 2 was 

just diagnosed on Tuesday with pneumonia has a history of COPD and currently 

still smokes although reports trying to cut down denies sore throat or runny 

nose just a persistent cough that is productive with yellow sputum was put on 

antibiotics and inhalers.  Reports no change.  Denies fever sees Dr. Fabian as 

patient's PCP





- Related Data


Allergies/Adverse Reactions: 


                                        





No Known Allergies Allergy (Verified 19 11:11)


   











Past Medical History





- General


Information source: Patient





- Social History


Smoking Status: Current Every Day Smoker


Cigarette use (# per day): Yes - Reports has cut down but smokes 3 to 4 

cigarettes a day currently


Family History: Reviewed & Not Pertinent





- Past Medical History


Cardiac Medical History: Reports: Hx Atrial Fibrillation, Hx Heart Attack


   Denies: Hx Coronary Artery Disease, Hx Hypertension, Hx Pulmonary Embolism


Pulmonary Medical History: Reports: Hx Asthma, Hx COPD, Hx Pneumonia


   Denies: Hx Bronchitis, Hx Respiratory Failure, Hx Sleep Apnea, Hx 

Tuberculosis


Neurological Medical History: Denies: Hx Cerebrovascular Accident, Hx Seizures


Endocrine Medical History: Denies: Hx Graves' Disease


Renal/ Medical History: Denies: Hx End Stage Renal Disease, Hx Kidney Stones, 

Hx Ovarian Cysts, Hx Peritoneal Dialysis, Hx Pelvic Inflammatory Disease


Malignancy Medical History: Reports: Hx Colorectal Cancer.  Denies: Hx Breast 

Cancer, Hx Cervical Cancer, Hx Lung Cancer, Hx Ovarian Cancer


GI Medical History: Reports: Hx Gastroesophageal Reflux Disease, Hx Ulcer.  

Denies: Hx Crohn's Disease, Hx Hiatal Hernia, Hx Irritable Bowel, Hx Liver 

Failure, Hx Pancreatitis


Musculoskeletal Medical History: Reports Hx Arthritis, Denies Hx Fibromyalgia, 

Denies Hx Muscular Dystrophy, Denies Hx Systemic Lupus Erythematosus


Psychiatric Medical History: Reports: Hx Depression


   Denies: Hx Bipolar Disorder, Hx Post Traumatic Stress Disorder, Hx 

Schizophrenia


Traumatic Medical History: Denies: Hx Fractures


Past Surgical History: Reports: Hx Abdominal Surgery, Hx Cholecystectomy, Hx 

Gynecologic Surgery - fibroid removal, Hx Hysterectomy, Hx Tubal Ligation, Other

- Bowel resection for colon cancer.  Denies: Hx Appendectomy, Hx Bowel Surgery, 

Hx  Section, Hx Colostomy, Hx Coronary Artery Bypass Graft, Hx Gastric 

Bypass Surgery, Hx Herniorrhaphy, Hx Mastectomy, Hx Pacemaker, Hx Tonsillectomy





Physical Exam





- General


General appearance: Appears well, Alert


In distress: None


Notes: 





PHYSICAL EXAMINATION: 


GENERAL: Well-appearing and in no acute distress. 


HEAD: Atraumatic, normocephalic. 


EYES: sclera anicteric, conjunctiva are normal. 


ENT: nares patent. Moist mucous membranes. 


NECK: Normal range of motion, supple without lymphadenopathy 


LUNGS: CTAB and equal. No wheezes rales or rhonchi. Resp even and unlabored. 

Left posterior and anterior expiratory wheeze noted. Occasional Moist non 

productive cough noted.


HEART: Regular rate and rhythm without murmurs 


ABDOMEN: Soft, nontender, normal bowel sounds, no guarding. 


EXTREMITIES:  No cyanosis. 


NEUROLOGICAL: Normal speech. 


PSYCH: Normal mood, normal affect. 


SKIN: Warm, Dry, normal turgor, 








- Respiratory


Breath sounds: Nonproductive cough - Left anterior and posterior expiratory 

wheeze noted., Wheezing





Diagnostic Results


Laboratory Results: 


Patient informed of negative rapid strep and negative rapid flu results. pending

strep culture pending COVID testing results.  Patient provided instructions on 

COVID to include: As a person under investigation for Covid 19, the [place] department of Health and Human Services, division of public health 

advises you to adhere to the following guidance until your test results are repo

rted to you.  If your test result is positive, you will receive additional 

information from your provider and your local health department at that time.


 


Remain at home until you are cleared by the health provider or public health 

authorities.


 


Keep a log of visitors to your home, notify any visitors to your home of your 

isolation status.


 


If you plan to move to a new address or leave the county, notify the local 

health department in your County.


 


Call your doctor or seek care if you have an urgent medical need.  Before 

seeking medical care, call ahead to get instructions from the provider before 

arriving at the medical office clinic or hospital.  Notify them that you are 

being tested for the virus that causes Covid 19 so that arrangements can be 

made, as necessary, to prevent transmission to others in the healthcare setting.

 Next, notify the local health department in your county.


 


If a medical emergency arises and you need to call 911, inform the first 

responders that you are being tested for the virus that causes Covid 19.  Next, 

notify the local health department in your [facility].





Patient Education/Counseling


Counseling/Education: 





Patient presents with upper respiratory symptoms worrisome for possible Covid 

19.  Patient does not have emergency worring symptoms such as difficulty 

breathing, shortness of breath, chest pain, pressure, confusion or cyanosis.  

Patient appears suitable for discharge. Patient instructed to contact PCP Dr. Fabian today.  Instructed to go to ED for persistent or worsening symptoms. 

Strongly encourage to Stop Smoking. Patient's vital signs are stable and patient

is nontoxic in appearance.  Good return precautions have been discussed with 

patient, patient verbalized understanding and is agreeable with discharge plan 

of care at this time.





RDC Discharge





- Discharge


Clinical Impression: 


 COVID - 19 SCREENING





Condition: Stable


Disposition: Home; Selfcare

## 2020-09-17 ENCOUNTER — HOSPITAL ENCOUNTER (EMERGENCY)
Dept: HOSPITAL 62 - ER | Age: 65
Discharge: HOME | End: 2020-09-17
Payer: COMMERCIAL

## 2020-09-17 VITALS — DIASTOLIC BLOOD PRESSURE: 101 MMHG | SYSTOLIC BLOOD PRESSURE: 131 MMHG

## 2020-09-17 DIAGNOSIS — Z20.828: ICD-10-CM

## 2020-09-17 DIAGNOSIS — I25.2: ICD-10-CM

## 2020-09-17 DIAGNOSIS — I48.91: ICD-10-CM

## 2020-09-17 DIAGNOSIS — J44.1: Primary | ICD-10-CM

## 2020-09-17 DIAGNOSIS — R53.81: ICD-10-CM

## 2020-09-17 DIAGNOSIS — R06.02: ICD-10-CM

## 2020-09-17 DIAGNOSIS — R10.31: ICD-10-CM

## 2020-09-17 DIAGNOSIS — R53.1: ICD-10-CM

## 2020-09-17 DIAGNOSIS — Z79.899: ICD-10-CM

## 2020-09-17 DIAGNOSIS — F17.200: ICD-10-CM

## 2020-09-17 LAB
ADD MANUAL DIFF: NO
ALBUMIN SERPL-MCNC: 4.3 G/DL (ref 3.5–5)
ALP SERPL-CCNC: 99 U/L (ref 38–126)
ANION GAP SERPL CALC-SCNC: 7 MMOL/L (ref 5–19)
APPEARANCE UR: CLEAR
APTT PPP: (no result) S
AST SERPL-CCNC: 29 U/L (ref 14–36)
BASOPHILS # BLD AUTO: 0.1 10^3/UL (ref 0–0.2)
BASOPHILS NFR BLD AUTO: 1.3 % (ref 0–2)
BILIRUB DIRECT SERPL-MCNC: 0.3 MG/DL (ref 0–0.4)
BILIRUB SERPL-MCNC: 0.6 MG/DL (ref 0.2–1.3)
BILIRUB UR QL STRIP: NEGATIVE
BUN SERPL-MCNC: 9 MG/DL (ref 7–20)
CALCIUM: 9.6 MG/DL (ref 8.4–10.2)
CHLORIDE SERPL-SCNC: 106 MMOL/L (ref 98–107)
CK MB SERPL-MCNC: 0.92 NG/ML (ref ?–4.55)
CK SERPL-CCNC: 114 U/L (ref 30–135)
CO2 SERPL-SCNC: 28 MMOL/L (ref 22–30)
EOSINOPHIL # BLD AUTO: 0.2 10^3/UL (ref 0–0.6)
EOSINOPHIL NFR BLD AUTO: 2.7 % (ref 0–6)
ERYTHROCYTE [DISTWIDTH] IN BLOOD BY AUTOMATED COUNT: 13.8 % (ref 11.5–14)
GLUCOSE SERPL-MCNC: 119 MG/DL (ref 75–110)
GLUCOSE UR STRIP-MCNC: NEGATIVE MG/DL
HCT VFR BLD CALC: 46.4 % (ref 36–47)
HGB BLD-MCNC: 15.8 G/DL (ref 12–15.5)
KETONES UR STRIP-MCNC: NEGATIVE MG/DL
LYMPHOCYTES # BLD AUTO: 1.5 10^3/UL (ref 0.5–4.7)
LYMPHOCYTES NFR BLD AUTO: 24.5 % (ref 13–45)
MCH RBC QN AUTO: 30.6 PG (ref 27–33.4)
MCHC RBC AUTO-ENTMCNC: 34 G/DL (ref 32–36)
MCV RBC AUTO: 90 FL (ref 80–97)
MONOCYTES # BLD AUTO: 0.9 10^3/UL (ref 0.1–1.4)
MONOCYTES NFR BLD AUTO: 14.1 % (ref 3–13)
NEUTROPHILS # BLD AUTO: 3.6 10^3/UL (ref 1.7–8.2)
NEUTS SEG NFR BLD AUTO: 57.4 % (ref 42–78)
NITRITE UR QL STRIP: NEGATIVE
PH UR STRIP: 7 [PH] (ref 5–9)
PLATELET # BLD: 264 10^3/UL (ref 150–450)
POTASSIUM SERPL-SCNC: 3.7 MMOL/L (ref 3.6–5)
PROT SERPL-MCNC: 7.7 G/DL (ref 6.3–8.2)
PROT UR STRIP-MCNC: NEGATIVE MG/DL
RBC # BLD AUTO: 5.15 10^6/UL (ref 3.72–5.28)
SP GR UR STRIP: 1
TOTAL CELLS COUNTED % (AUTO): 100 %
TROPONIN I SERPL-MCNC: < 0.012 NG/ML
UROBILINOGEN UR-MCNC: NEGATIVE MG/DL (ref ?–2)
WBC # BLD AUTO: 6.3 10^3/UL (ref 4–10.5)

## 2020-09-17 PROCEDURE — 81001 URINALYSIS AUTO W/SCOPE: CPT

## 2020-09-17 PROCEDURE — 85025 COMPLETE CBC W/AUTO DIFF WBC: CPT

## 2020-09-17 PROCEDURE — 94640 AIRWAY INHALATION TREATMENT: CPT

## 2020-09-17 PROCEDURE — C9803 HOPD COVID-19 SPEC COLLECT: HCPCS

## 2020-09-17 PROCEDURE — 36415 COLL VENOUS BLD VENIPUNCTURE: CPT

## 2020-09-17 PROCEDURE — 84443 ASSAY THYROID STIM HORMONE: CPT

## 2020-09-17 PROCEDURE — 87635 SARS-COV-2 COVID-19 AMP PRB: CPT

## 2020-09-17 PROCEDURE — 96366 THER/PROPH/DIAG IV INF ADDON: CPT

## 2020-09-17 PROCEDURE — 93010 ELECTROCARDIOGRAM REPORT: CPT

## 2020-09-17 PROCEDURE — 96375 TX/PRO/DX INJ NEW DRUG ADDON: CPT

## 2020-09-17 PROCEDURE — 82553 CREATINE MB FRACTION: CPT

## 2020-09-17 PROCEDURE — 93005 ELECTROCARDIOGRAM TRACING: CPT

## 2020-09-17 PROCEDURE — 71045 X-RAY EXAM CHEST 1 VIEW: CPT

## 2020-09-17 PROCEDURE — 80053 COMPREHEN METABOLIC PANEL: CPT

## 2020-09-17 PROCEDURE — 74177 CT ABD & PELVIS W/CONTRAST: CPT

## 2020-09-17 PROCEDURE — 96365 THER/PROPH/DIAG IV INF INIT: CPT

## 2020-09-17 PROCEDURE — 99285 EMERGENCY DEPT VISIT HI MDM: CPT

## 2020-09-17 PROCEDURE — 82550 ASSAY OF CK (CPK): CPT

## 2020-09-17 PROCEDURE — 84484 ASSAY OF TROPONIN QUANT: CPT

## 2020-09-17 RX ADMIN — MAGNESIUM SULFATE IN DEXTROSE SCH MLS/HR: 10 INJECTION, SOLUTION INTRAVENOUS at 22:09

## 2020-09-17 RX ADMIN — MAGNESIUM SULFATE IN DEXTROSE SCH MLS/HR: 10 INJECTION, SOLUTION INTRAVENOUS at 20:53

## 2020-09-17 NOTE — RADIOLOGY REPORT (SQ)
EXAM DESCRIPTION:  CHEST SINGLE VIEW



IMAGES COMPLETED DATE/TIME:  9/17/2020 3:19 pm



REASON FOR STUDY:  sob



COMPARISON:  5/5/2020



NUMBER OF VIEWS:  One view.



TECHNIQUE:  Single frontal radiographic view of the chest acquired.



LIMITATIONS:  None.



FINDINGS:  LUNGS AND PLEURA: Scarring in the left costophrenic angle. No pleural effusion. Attenuated
 blood vessels and flattened janny-diaphragms.

MEDIASTINUM AND HILAR STRUCTURES: No masses.  Contour normal.

HEART AND VASCULAR STRUCTURES: Heart normal in size.  Normal vasculature.

BONES: No acute findings.

HARDWARE: None in the chest.

OTHER: No other significant finding.



IMPRESSION:  COPD.  NO ACUTE RADIOGRAPHIC FINDING IN THE CHEST.



TECHNICAL DOCUMENTATION:  JOB ID:  [id number]

 2011 [employer]- All Rights Reserved



Reading location - IP/workstation name: [email or web address]

## 2020-09-17 NOTE — RADIOLOGY REPORT (SQ)
EXAM DESCRIPTION:  CT ABD/PELVIS WITH IV   ORAL



IMAGES COMPLETED DATE/TIME:  9/17/2020 7:19 pm



REASON FOR STUDY:  RLQ pain



COMPARISON:  10/18/2018



TECHNIQUE:  CT scan of the abdomen and pelvis performed using helical scanning technique with dynamic
 intravenous contrast injection.  Oral contrast. Images reviewed with lung, soft tissue, and bone win
dows. Reconstructed coronal and sagittal MPR images reviewed. Delayed images for evaluation of the ur
inary system also acquired. All images stored on PACS.

All CT scanners at this facility use dose modulation, iterative reconstruction, and/or weight based d
osing when appropriate to reduce radiation dose to as low as reasonably achievable (ALARA).

[facility]: Dose Right  [facility]: CareDose    [facility]: Dose Right    [facility]: Teradose 4D    [facility]: [employer]



CONTRAST TYPE AND DOSE:  contrast/concentration: Isovue 350.00 mmol/ml; Total Contrast Delivered: 85.
0 ml; Total Saline Delivered: 69.0 ml



RENAL FUNCTION:  BUN 9 creatinine 0.72



RADIATION DOSE:  CT Rad equipment meets quality standard of care and radiation dose reduction techniq
ues were employed. CTDIvol: 7.6 - 10.6 mGy. DLP: 922 mGy-cm..



LIMITATIONS:  None.



FINDINGS:  LOWER CHEST: No significant findings. No nodules or infiltrates.

LIVER: Normal size. No masses.  No dilated ducts.

SPLEEN: Normal size. No focal lesions.

PANCREAS: No masses. No significant calcifications. No adjacent inflammation or peripancreatic fluid 
collections. Pancreatic duct not dilated.

GALLBLADDER: No identified stones by CT criteria. No inflammatory changes to suggest cholecystitis.

ADRENAL GLANDS: No significant masses or asymmetry.

RIGHT KIDNEY AND URETER: No solid masses.   No significant calcifications.   No hydronephrosis or hyd
roureter.

LEFT KIDNEY AND URETER: No solid masses.   Renal vascular calcification.   No hydronephrosis or hydro
ureter.

AORTA AND VESSELS: No aneurysm. No dissection. Renal arteries, SMA, celiac without stenosis.

RETROPERITONEUM: No retroperitoneal adenopathy, hemorrhage or masses.

BOWEL AND PERITONEAL CAVITY: Mild sigmoid diverticulosis with no associated inflammation.

APPENDIX: Surgically absent.

PELVIS: No mass.  No free fluid. Normal bladder.

ABDOMINAL WALL: No masses. No hernias.

BONES: No significant or acute findings.

OTHER: No other significant finding.



IMPRESSION:  Mild diverticulosis coli.  No acute finding in the abdomen or pelvis.



TECHNICAL DOCUMENTATION:  JOB ID:  [id number]

Quality ID # [id number]: Final reports with documentation of one or more dose reduction techniques (e.g., Au
tomated exposure control, adjustment of the mA and/or kV according to patient size, use of iterative 
reconstruction technique)

 2011 [employer]- All Rights Reserved



Reading location - IP/workstation name: [email or web address]

## 2020-09-17 NOTE — ER DOCUMENT REPORT
ED GI/





- General


Chief Complaint: Abdominal Pain


Stated Complaint: DIFFICULTY BREATHING


Time Seen by Provider: 20 14:37


Primary Care Provider: 


BRANDY PARRISH MD [Primary Care Provider] - Follow up as needed


Notes: 





CHIEF COMPLAINT: Multiple complaints





HPI: 64-year-old female with COPD history presenting with multiple complaints.  

Patient complains of generalized weakness and malaise for 3 weeks.  Patient comp

lains of pain in the right lower quadrant right groin region over the last week 

intermittently.  Patient complains of some cloudiness of her urine.  Patient 

complains of intermittent episodes of shortness of breath which she believes 

that this is her COPD.  Patient was to follow with her primary care provider 

today and missed the appointment because she did not feel well.  No definite 

fevers.  No chest pain.  No current shortness of breath.  No current abdominal 

pain.  Patient states she has been moving her bowels normally.





ROS: See HPI - all other systems were reviewed and are otherwise negative


Constitutional: no fever 


Eyes: no drainage, no blurred vision


ENT: no runny nose, no sore throat


Cardiovascular:  no chest pain 


Resp: + SOB, no cough


GI: no vomiting, no diarrhea, + abdominal pain


: no dysuria


Integumentary: no rash 


Allergy: no hives 


Musculoskeletal: no extremity pain or swelling 


Neurological: no numbness/tingling, positive generalized weakness





MEDICATIONS: I agree with the patient medications as charted by the RN.





ALLERGIES: I agree with the allergies as charted by the RN.





PAST MEDICAL HISTORY/PAST SURGICAL HISTORY: Reviewed and agree as charted by RN.





SOCIAL HISTORY: Reviewed and agree as charted by RN.





FAMILY HISTORY: No significant familial comorbid conditions directly related to 

patient complaint





EXAM:


Reviewed vital signs as charted by RN.


CONSTITUTIONAL: Alert and oriented and responds appropriately to questions. 

Well-appearing; well-nourished


HEAD: Normocephalic; atraumatic


EYES: PERRL; Conjunctivae clear, sclerae non-icteric


ENT: normal nose; no rhinorrhea; moist mucous membranes; pharynx without lesions

noted, no uvula edema or deviation, no tonsillar hypertrophy, phonation normal


NECK: Supple without meningismus; non-tender; no cervical lymphadenopathy, no 

masses


CARD: RRR; no murmurs, no clicks, no rubs, no gallops; symmetric distal pulses


RESP: Normal chest excursion without splinting or tachypnea; breath sounds clear

and equal bilaterally; no wheezes, no rhonchi, no rales, pulse oximetry 96% on 

room air not hypoxic


ABD/GI: Normal bowel sounds; non-distended; soft, unable to reproduce any 

abdominal pain on palpation, no rebound, no guarding; no palpable organomegaly 

or masses.


BACK:  The back appears normal and is non-tender to palpation, there is no CVA 

tenderness


EXT: Normal ROM in all joints; non-tender to palpation; no cyanosis, no 

effusions, no edema   


SKIN: Normal color for age and race; warm; dry; good turgor; no acute lesions 

noted


NEURO: Moves all extremities equally; Motor and sensory function intact 


PSYCH: The patient's mood and manner are appropriate. Grooming and personal 

hygiene are appropriate.





MDM: 64-year-old female presenting with multiple various vague complaints.  

Generalized weakness for 3 weeks.  No chest pain.  Has had intermittent 

shortness of breath which she believes is her COPD.  No fever.  Patient has had 

intermittent right lower quadrant pain over the last week.  She has no pain on 

exam at this time.  Initial screening labs drawn by triage process showed normal

lab work normal WBC count normal chemistries negative troponin, normal urine.  

We will add CT abdomen given her age and complaint of abdominal pain.  Will add 

chest x-ray given her complaint of shortness of breath.  She is not hypoxic


TRAVEL OUTSIDE OF THE [place] IN LAST 30 DAYS: No





- Related Data


Allergies/Adverse Reactions: 


                                        





No Known Allergies Allergy (Verified 20 12:23)


   











Past Medical History





- Social History


Smoking Status: Current Every Day Smoker


Chew tobacco use (# tins/day): No


Frequency of alcohol use: None


Drug Abuse: None


Family History: Reviewed & Not Pertinent


Patient has homicidal ideation: No





- Past Medical History


Cardiac Medical History: Reports: Hx Atrial Fibrillation, Hx Heart Attack


   Denies: Hx Coronary Artery Disease, Hx Hypertension, Hx Pulmonary Embolism


Pulmonary Medical History: Reports: Hx Asthma, Hx COPD, Hx Pneumonia


   Denies: Hx Bronchitis, Hx Respiratory Failure, Hx Sleep Apnea, Hx 

Tuberculosis


Neurological Medical History: Denies: Hx Cerebrovascular Accident, Hx Seizures


Endocrine Medical History: Denies: Hx Graves' Disease


Renal/ Medical History: Denies: Hx End Stage Renal Disease, Hx Kidney Stones, 

Hx Ovarian Cysts, Hx Peritoneal Dialysis, Hx Pelvic Inflammatory Disease


Malignancy Medical History: Reports: Hx Colorectal Cancer.  Denies: Hx Breast 

Cancer, Hx Cervical Cancer, Hx Lung Cancer, Hx Ovarian Cancer


GI Medical History: Reports: Hx Gastroesophageal Reflux Disease, Hx Ulcer.  

Denies: Hx Crohn's Disease, Hx Hiatal Hernia, Hx Irritable Bowel, Hx Liver 

Failure, Hx Pancreatitis


Musculoskeletal Medical History: Reports Hx Arthritis, Denies Hx Fibromyalgia, 

Denies Hx Muscular Dystrophy, Denies Hx Systemic Lupus Erythematosus


Psychiatric Medical History: Reports: Hx Depression


   Denies: Hx Bipolar Disorder, Hx Post Traumatic Stress Disorder, Hx Schizo

phrenia


Traumatic Medical History: Denies: Hx Fractures


Past Surgical History: Reports: Hx Abdominal Surgery, Hx Cholecystectomy, Hx 

Gynecologic Surgery - fibroid removal, Hx Hysterectomy, Hx Tubal Ligation, Other

- Bowel resection for colon cancer.  Denies: Hx Appendectomy, Hx Bowel Surgery, 

Hx  Section, Hx Colostomy, Hx Coronary Artery Bypass Graft, Hx Gastric 

Bypass Surgery, Hx Herniorrhaphy, Hx Mastectomy, Hx Pacemaker, Hx Tonsillectomy





- Immunizations


Hx Diphtheria, Pertussis, Tetanus Vaccination: Yes





Physical Exam





- Vital signs


Vitals: 


                                        











Temp


 


 98.4 F 


 


 20 13:33














Course





- Re-evaluation


Re-evalutation: 





20 17:07


Patient has been insistent on having Symbicort here, we do not stock her 

medication in the [facility] or the pharmacy here.  I have given the patient Decadron

given her COPD issues.  She is only on prednisone 4 mg for maintenance of her 

COPD.  Patient was given a breathing treatment here because she complained of 

some shortness of breath.  Her screening cardiac labs were normal.  Awaiting CT 

imaging for the abdominal pain that I cannot reproduce on palpation currently.  

Patient has refused COVID test


20 17:11


EKG shows a sinus tachycardia initially with a heart rate of 111.  .  QT 

324.  QTc 441.  Inferior infarct that is old.  No other ectopy or acute 

abnormalities interpreted by emergency department physician


20 19:49


CT imaging does not show acute findings does show diverticulosis without 

diverticulitis.  No definitive reason for patient's discomfort.  She has not 

been short of breath while she has been here, not hypoxic.  She is been given 

Decadron for her COPD which is likely a mild exacerbation.  X-ray imaging did 

not reveal evidence of pneumonia.  No definitive reason for patient's complaint 

of generalized weakness for 3 weeks.  We will have her follow-up with her PCP 

tomorrow for reevaluation





- Vital Signs


Vital signs: 


                                        











Temp Pulse Resp BP Pulse Ox


 


 98.2 F   98   21 H  127/90 H  94 


 


 20 17:01  20 15:00  20 17:01  20 17:00  20 18:00














- Laboratory


Result Diagrams: 


                                 20 13:47





                                 20 13:47


Laboratory results interpreted by me: 


                                        











  20





  13:47 13:47


 


Hgb  15.8 H 


 


Mono % (Auto)  14.1 H 


 


Glucose   119 H














Discharge





- Discharge


Clinical Impression: 


 COPD exacerbation, Generalized weakness, Abdominal pain, right lower quadrant





Condition: Stable


Disposition: HOME, SELF-CARE


Additional Instructions: 


Your lab work and imaging studies today did not reveal a definitive reason for 

your symptoms.  Your chest x-ray did not show evidence of pneumonia.  Your CT 

imaging did not reveal evidence of a reason for the discomfort in the abdomen 

did reveal that you have diverticulosis.  Continue your previous medications at 

home.  You are to quarantine at home pending your Covid study result which may 

take 2-5 days.  You will be called from the hospital with your results.  You 

should call your PCP tomorrow for further evaluation


Referrals: 


BRANDY PARRISH MD [Primary Care Provider] - Follow up as needed

## 2020-09-17 NOTE — EKG REPORT
SEVERITY:- ABNORMAL ECG -

SINUS TACHYCARDIA

INFERIOR INFARCT, OLD

:

Confirmed by: Cody Fowler 17-Sep-2020 18:25:35

## 2020-10-14 ENCOUNTER — HOSPITAL ENCOUNTER (OUTPATIENT)
Dept: HOSPITAL 62 - WI | Age: 65
End: 2020-10-14
Attending: CLINIC/CENTER
Payer: MEDICARE

## 2020-10-14 DIAGNOSIS — M81.0: ICD-10-CM

## 2020-10-14 DIAGNOSIS — Z12.31: Primary | ICD-10-CM

## 2020-10-14 DIAGNOSIS — N95.8: ICD-10-CM

## 2020-10-14 DIAGNOSIS — N63.20: ICD-10-CM

## 2020-10-14 PROCEDURE — 77063 BREAST TOMOSYNTHESIS BI: CPT

## 2020-10-14 PROCEDURE — 77080 DXA BONE DENSITY AXIAL: CPT

## 2020-10-14 PROCEDURE — 77067 SCR MAMMO BI INCL CAD: CPT

## 2020-10-14 NOTE — WOMENS IMAGING REPORT
EXAM DESCRIPTION:  BONE DENSITY HIP/SPINE



IMAGES COMPLETED DATE/TIME:  10/14/2020 12:07 pm



REASON FOR STUDY:  N95.8 OTHER SPECIFIED MENOPAUSAL AND PERIMENOPAUSAL DISORDERS Z12.31  NADIYA TORRES MAMMOGRAM FOR MALIGNANT NEOPLASM OF DAGMAR



COMPARISON:   None.



TECHNIQUE:  Dual-Energy X-ray Absorptiometry (DEXA) of the AP Spine and Hip.



LIMITATIONS:  None.



FINDINGS:  LUMBAR SPINE:

The bone mineral density (BMD) measured from L1-L4 in the AP projection correlates with a T-score of 
-4.3, which is osteoporosis as defined by the World Health Organization.

HIP:

The bone mineral density (BMD) measured in the left hip correlates with a T-score of -2.5, which is o
steoporosis as defined by the World Health Organization.



IMPRESSION:  1. LUMBAR SPINE: OSTEOPOROSIS.

2.  HIP: OSTEOPOROSIS.



COMMENT:  The World Health Organization defines low BMD as follows:

T-score:

Normal:  Greater than -1.0

Osteopenia: Between -1.0 and -2.5

Osteoporosis:  Less than -2.5 without fractures

Established osteoporosis:  Less than -2.5 with fractures

In general, you may wish to consider:

Diagnosis          Treatment                     Follow-up DEXA

Normal BMD      Prevention                    2-3 years

Osteopenia       Prevention/Therapy        1-2 years

Osteoporosis     Therapy                        Yearly



TECHNICAL DOCUMENTATION:  JOB ID:  [id number]

 2011 [employer]- All Rights Reserved



Reading location - IP/workstation name: [email or web address]

## 2020-10-14 NOTE — WOMENS IMAGING REPORT
EXAM DESCRIPTION:  3D SCREENING MAMMO BILAT



IMAGES COMPLETED DATE/TIME:  10/14/2020 12:07 pm



REASON FOR STUDY:  Z12.31 ENCOUNTER FOR SCREENING MAMMOGRAM FOR MALIGNANT NEOPLASM OF BREAST Z12.31  
ENCNTR SCREEN MAMMOGRAM FOR MALIGNANT NEOPLASM OF DAGMAR



COMPARISON:  11/1/2017 and 9/20/2016.



EXAM PARAMETERS:  Standard craniocaudal and mediolateral oblique views of each breast recorded using 
digital acquisition and breast tomosynthesis.

Read with the assistance of CAD.

.[facility] - R2  Version 9.2



LIMITATIONS:  None.



FINDINGS:  RIGHT BREAST

MASSES: No suspicious masses.

CALCIFICATIONS: No new or suspicious calcifications.

ARCHITECTURAL DISTORTION: None.

ASYMMETRY: None noted.

OTHER: No other significant findings.

LEFT BREAST

MASSES: New circumscribed mass in the inferolateral breast, located 6 to 7 cm from the nipple.

CALCIFICATIONS: No new or suspicious calcifications.

ARCHITECTURAL DISTORTION: None.

ASYMMETRY: None noted.

OTHER: No other significant findings.



IMPRESSION:  New circumscribed mass in the inferolateral left breast.  Stable mammographic appearance
 of the right breast.

0 Incomplete: Needs Additional Imaging Evaluation and/or prior Mammograms for Comparison.



BREAST DENSITY:  b. There are scattered areas of fibroglandular density.



BIRAD:  ASSESSMENT:  0 Incomplete:  Needs Additional Imaging Evaluation and/or prior Mammograms for C
omparison.



RECOMMENDATION:  RECOMMENDED FOLLOW-UP: Recommend additional evaluation with ultrasound of the left b
reast.  Recommend routine screening mammography of the right breast.

The patient will be contacted for additional imaging.



COMMENT:  The patient has been notified of the results by letter per SA requirements. Additional no
tification policies are in place for contacting patient with suspicious or incomplete findings.

Quality ID #225: The American College of Radiology recommends an annual screening mammogram for women
 aged 40 years or over. This facility utilizes a reminder system to ensure that all patients receive 
reminder letters, and/or direct phone calls for appointments. This includes reminders for routine scr
eening mammograms, diagnostic mammograms, or other Breast Imaging Interventions when appropriate.  Th
is patient will be placed in the appropriate reminder system.



TECHNICAL DOCUMENTATION:  FINDING NUMBER: (1)

ASSESSMENT: (1)

JOB ID:  [id number]

 2011 [employer]- All Rights Reserved



Reading location - IP/workstation name: [email or web address]

## 2020-10-28 ENCOUNTER — HOSPITAL ENCOUNTER (OUTPATIENT)
Dept: HOSPITAL 62 - WI | Age: 65
End: 2020-10-28
Attending: CLINIC/CENTER
Payer: COMMERCIAL

## 2020-10-28 DIAGNOSIS — N60.02: Primary | ICD-10-CM

## 2020-10-28 PROCEDURE — 76642 ULTRASOUND BREAST LIMITED: CPT

## 2020-10-28 NOTE — WOMENS IMAGING REPORT
EXAM DESCRIPTION:  U/S BREAST UNILAT LIMITED



IMAGES COMPLETED DATE/TIME:  10/28/2020 12:35 pm



REASON FOR STUDY:  R92.2 INCONCLUSIVE MAMMOGRAM R92.2  INCONCLUSIVE MAMMOGRAM



COMPARISON:  Mammogram 10/14/2020



TECHNIQUE:  Real-time and static grayscale imaging performed of the left breast targeted to the area 
of clinical/mammographic concern. Selected color Doppler images recorded.



LIMITATIONS:  None.



FINDINGS:  5 mm cluster of cysts in the 4- 5 o'clock position.  Uncertain if this is the mammographic
 correlate.



IMPRESSION:  Probable benign cluster of cysts.



BIRAD:  3 Probably benign finding. Initial short-interval follow-up suggested.



RECOMMENDATION:  RECOMMENDED FOLLOW-UP: 6 month follow-up diagnostic mammogram and potential ultrasou
nd left breast.



COMMENT:  The American College of Radiology (ACR) has developed recommendations for screening MRI of 
the breasts in certain patient populations, to be used in conjunction with mammography.  Breast MRI s
urveillance may be appropriate for women with more than 20% lifetime risk of developing breast cancer
  as determined by genetic testing, significant family history of the disease, or history of mantle r
adiation for Hodgkins Disease.  ACR Practice Guidelines 2008.



TECHNICAL DOCUMENTATION:  JOB ID:  [id number]

 2011 [employer]- All Rights Reserved



Reading location - IP/workstation name: [email or web address]

## 2020-12-06 ENCOUNTER — HOSPITAL ENCOUNTER (EMERGENCY)
Dept: HOSPITAL 62 - ER | Age: 65
Discharge: HOME | End: 2020-12-06
Payer: COMMERCIAL

## 2020-12-06 VITALS — DIASTOLIC BLOOD PRESSURE: 95 MMHG | SYSTOLIC BLOOD PRESSURE: 137 MMHG

## 2020-12-06 DIAGNOSIS — Z79.899: ICD-10-CM

## 2020-12-06 DIAGNOSIS — F17.200: ICD-10-CM

## 2020-12-06 DIAGNOSIS — R05: ICD-10-CM

## 2020-12-06 DIAGNOSIS — R00.0: ICD-10-CM

## 2020-12-06 DIAGNOSIS — R06.03: ICD-10-CM

## 2020-12-06 DIAGNOSIS — R06.02: ICD-10-CM

## 2020-12-06 DIAGNOSIS — Z20.828: ICD-10-CM

## 2020-12-06 DIAGNOSIS — I25.2: ICD-10-CM

## 2020-12-06 DIAGNOSIS — I48.91: ICD-10-CM

## 2020-12-06 DIAGNOSIS — J44.1: Primary | ICD-10-CM

## 2020-12-06 LAB
ADD MANUAL DIFF: NO
ALBUMIN SERPL-MCNC: 4.1 G/DL (ref 3.5–5)
ALP SERPL-CCNC: 94 U/L (ref 38–126)
ANION GAP SERPL CALC-SCNC: 8 MMOL/L (ref 5–19)
AST SERPL-CCNC: 27 U/L (ref 14–36)
BASOPHILS # BLD AUTO: 0.1 10^3/UL (ref 0–0.2)
BASOPHILS NFR BLD AUTO: 0.9 % (ref 0–2)
BILIRUB DIRECT SERPL-MCNC: 0.2 MG/DL (ref 0–0.4)
BILIRUB SERPL-MCNC: 0.4 MG/DL (ref 0.2–1.3)
BUN SERPL-MCNC: 9 MG/DL (ref 7–20)
CALCIUM: 10.2 MG/DL (ref 8.4–10.2)
CHLORIDE SERPL-SCNC: 109 MMOL/L (ref 98–107)
CO2 SERPL-SCNC: 23 MMOL/L (ref 22–30)
EOSINOPHIL # BLD AUTO: 0.1 10^3/UL (ref 0–0.6)
EOSINOPHIL NFR BLD AUTO: 0.7 % (ref 0–6)
ERYTHROCYTE [DISTWIDTH] IN BLOOD BY AUTOMATED COUNT: 14.5 % (ref 11.5–14)
GLUCOSE SERPL-MCNC: 146 MG/DL (ref 75–110)
HCT VFR BLD CALC: 47.3 % (ref 36–47)
HGB BLD-MCNC: 15.7 G/DL (ref 12–15.5)
LYMPHOCYTES # BLD AUTO: 1.1 10^3/UL (ref 0.5–4.7)
LYMPHOCYTES NFR BLD AUTO: 13.2 % (ref 13–45)
MCH RBC QN AUTO: 30.7 PG (ref 27–33.4)
MCHC RBC AUTO-ENTMCNC: 33.2 G/DL (ref 32–36)
MCV RBC AUTO: 92 FL (ref 80–97)
MONOCYTES # BLD AUTO: 0.6 10^3/UL (ref 0.1–1.4)
MONOCYTES NFR BLD AUTO: 7.1 % (ref 3–13)
NEUTROPHILS # BLD AUTO: 6.5 10^3/UL (ref 1.7–8.2)
NEUTS SEG NFR BLD AUTO: 78.1 % (ref 42–78)
NT PRO BNP: 25 PG/ML (ref ?–125)
PLATELET # BLD: 277 10^3/UL (ref 150–450)
POTASSIUM SERPL-SCNC: 4.7 MMOL/L (ref 3.6–5)
PROT SERPL-MCNC: 6.9 G/DL (ref 6.3–8.2)
RBC # BLD AUTO: 5.11 10^6/UL (ref 3.72–5.28)
TOTAL CELLS COUNTED % (AUTO): 100 %
TROPONIN I SERPL-MCNC: < 0.012 NG/ML
WBC # BLD AUTO: 8.3 10^3/UL (ref 4–10.5)

## 2020-12-06 PROCEDURE — 85025 COMPLETE CBC W/AUTO DIFF WBC: CPT

## 2020-12-06 PROCEDURE — C9803 HOPD COVID-19 SPEC COLLECT: HCPCS

## 2020-12-06 PROCEDURE — 93005 ELECTROCARDIOGRAM TRACING: CPT

## 2020-12-06 PROCEDURE — 84484 ASSAY OF TROPONIN QUANT: CPT

## 2020-12-06 PROCEDURE — 36415 COLL VENOUS BLD VENIPUNCTURE: CPT

## 2020-12-06 PROCEDURE — 87635 SARS-COV-2 COVID-19 AMP PRB: CPT

## 2020-12-06 PROCEDURE — 80053 COMPREHEN METABOLIC PANEL: CPT

## 2020-12-06 PROCEDURE — 87040 BLOOD CULTURE FOR BACTERIA: CPT

## 2020-12-06 PROCEDURE — 99285 EMERGENCY DEPT VISIT HI MDM: CPT

## 2020-12-06 PROCEDURE — 96360 HYDRATION IV INFUSION INIT: CPT

## 2020-12-06 PROCEDURE — 71045 X-RAY EXAM CHEST 1 VIEW: CPT

## 2020-12-06 PROCEDURE — 83605 ASSAY OF LACTIC ACID: CPT

## 2020-12-06 PROCEDURE — 83880 ASSAY OF NATRIURETIC PEPTIDE: CPT

## 2020-12-06 PROCEDURE — 93010 ELECTROCARDIOGRAM REPORT: CPT

## 2020-12-06 PROCEDURE — 94640 AIRWAY INHALATION TREATMENT: CPT

## 2020-12-06 NOTE — EKG REPORT
SEVERITY:- OTHERWISE NORMAL ECG -

SINUS TACHYCARDIA

LOW VOLTAGE IN FRONTAL LEADS

:

Confirmed by: Neftaly Torres MD 06-Dec-2020 08:35:39

## 2020-12-06 NOTE — ER DOCUMENT REPORT
ED Respiratory Problem





- General


Chief Complaint: Respiratory Distress


Stated Complaint: RESPIRATORY DISTRESS


Time Seen by Provider: 20 01:00


Primary Care Provider: 


BRANDY PARRISH MD [Primary Care Provider] - Follow up as needed


TRAVEL OUTSIDE OF THE [place] IN LAST 30 DAYS: No





- HPI


Notes: 





65-year-old female with past medical history significant for COPD to the 

emergency department by EMS with complaints of acute shortness of breath with 

respiratory distress.  Apparently patient called EMS after she took some cough 

medicine and it "went down the wrong way".  She states that she did not got 

acutely short of breath after that.  She states that she has been struggling to 

catch her breath and try to bring up the cough syrup.  When medics arrived on 

scene she was in respiratory distress.  They did not swab her for Covid.  They 

gave her 1 DuoNeb and 2 albuterol treatments.  They also gave her magnesium and 

Solu-Medrol.  They attempted to place her on CPAP but she would not tolerate it.

 They put her on 4 L of oxygen and she was able to calm down.  Medics report 

that upon her arrival here she is significantly improved.  Patient does have a 

pertinent past medical history of needing to be intubated for COPD about 8 years

ago.  She also reports that her niece has been positive for COVID-19.  She and 

the rest of her family were tested a couple days ago but they have not received 

any information about their testing.  She denies any fevers or chills.  She 

denies any chest pain.  She denies any ear pain, sore throat, loss of taste or 

smell.  She states that she is trying to quit smoking.  She is not on oxygen at 

home.





- Related Data


Allergies/Adverse Reactions: 


                                        





No Known Allergies Allergy (Verified 20 12:23)


   











Past Medical History





- General


Information source: Patient, Emergency Med Personnel





- Social History


Smoking Status: Current Every Day Smoker


Frequency of alcohol use: None


Drug Abuse: None


Family History: Reviewed & Not Pertinent





- Past Medical History


Cardiac Medical History: Reports: Hx Atrial Fibrillation, Hx Heart Attack


   Denies: Hx Coronary Artery Disease, Hx Hypertension, Hx Pulmonary Embolism


Pulmonary Medical History: Reports: Hx Asthma, Hx COPD, Hx Pneumonia


   Denies: Hx Bronchitis, Hx Respiratory Failure, Hx Sleep Apnea, Hx 

Tuberculosis


Neurological Medical History: Denies: Hx Cerebrovascular Accident, Hx Seizures


Endocrine Medical History: Denies: Hx Graves' Disease


Renal/ Medical History: Denies: Hx End Stage Renal Disease, Hx Kidney Stones, 

Hx Ovarian Cysts, Hx Peritoneal Dialysis, Hx Pelvic Inflammatory Disease


Malignancy Medical History: Reports: Hx Colorectal Cancer.  Denies: Hx Breast 

Cancer, Hx Cervical Cancer, Hx Lung Cancer, Hx Ovarian Cancer


GI Medical History: Reports: Hx Gastroesophageal Reflux Disease, Hx Ulcer.  

Denies: Hx Crohn's Disease, Hx Hiatal Hernia, Hx Irritable Bowel, Hx Liver Fa

ilure, Hx Pancreatitis


Musculoskeletal Medical History: Reports Hx Arthritis, Denies Hx Fibromyalgia, 

Denies Hx Muscular Dystrophy, Denies Hx Systemic Lupus Erythematosus


Psychiatric Medical History: Reports: Hx Depression


   Denies: Hx Bipolar Disorder, Hx Post Traumatic Stress Disorder, Hx 

Schizophrenia


Traumatic Medical History: Denies: Hx Fractures


Past Surgical History: Reports: Hx Abdominal Surgery, Hx Cholecystectomy, Hx 

Gynecologic Surgery - fibroid removal, Hx Hysterectomy, Hx Tubal Ligation, Other

- Bowel resection for colon cancer.  Denies: Hx Appendectomy, Hx Bowel Surgery, 

Hx  Section, Hx Colostomy, Hx Coronary Artery Bypass Graft, Hx Gastric 

Bypass Surgery, Hx Herniorrhaphy, Hx Mastectomy, Hx Pacemaker, Hx Tonsillectomy





- Immunizations


Hx Diphtheria, Pertussis, Tetanus Vaccination: Yes





Physical Exam





- Vital signs


Vitals: 


                                        











Pulse Ox


 


 95 


 


 20 00:48














Course





- Re-evaluation


Re-evalutation: 





20 


Rounded on patient, she is improved.  Much more alert and interactive.   We will

continue to monitor her.  Her HR goes down to 103, oxygen levels remain 97 % on 

Room air





Rounded on patient, she is a little more agitated, asking for her morning meds 

-- spiriva and symbicort plus her breathing treatment.  Reausculation reveals 

improved air movement with persistent expiratory wheezes.   We will plan to 

challenge her with ambulation and see how she does. 





20 06:18


We ambulated the patient.  She did not drop her oxygenation level.  She did get 

little bit winded and tachycardic.  She states this is her normal baseline at 

home.  She states she feels a lot better.  She thinks what led to her 

respiratory distress was when she accidentally swallowed cough syrup wrong.  We 

have talked about keeping her longer in the emergency department but she is 

requesting to be discharged.  She states that if she gets worse she would like 

to come home.  I discussed this with my ER attending, Dr. Berrios, and he agrees

with the plan.








Impression: COPD exacerbation.  Patient has significantly improved since she 

first arrived.  I talked to her about possible admission but she states that she

does not want to stay.  When she ambulates she does become more tachycardic and 

winded but she does not drop her oxygen levels.  She states that she would like 

to go home and if she gets worse she will come back.  I have encouraged her to 

follow-up with her pulmonologist and primary care physician.








- Vital Signs


Vital signs: 


                                        











Temp Pulse Resp BP Pulse Ox


 


 98.4 F   100   14   127/86 H  96 


 


 20 01:06  20 01:06  20 04:01  20 04:00  20 04:01











                                        











Temp Pulse Resp BP Pulse Ox


 


 98.9 F   100   17   137/95 H  96 


 


 20 06:23  20 01:06  20 06:23  20 06:23  20 06:23








                                 Intake & Output











 20





 06:59 06:59 06:59


 


Intake Total   500


 


Balance   500


 


Weight   74.843 kg








                                  Weight/Height





Weight                           74.843 kg


Height                           5 ft 7 in











- Laboratory


Result Diagrams: 


                                 20 01:35





                                 20 03:15


Laboratory results interpreted by me: 


                                        











  20





  01:35 01:35 03:15


 


Hgb  15.7 H  


 


Hct  47.3 H  


 


RDW  14.5 H  


 


Seg Neutrophils %  78.1 H  


 


Chloride    109 H


 


Glucose    146 H


 


Lactic Acid   2.5 H 














- Diagnostic Test


Radiology reviewed: Image reviewed, Reports reviewed





- EKG Interpretation by Me


Additional EKG results interpreted by me: 





rate: 121


Rhythm: sinus tachycardia


Interpretation:  No STEMI, low voltage, no significant change from prior on 

20





Discharge





- Discharge


Clinical Impression: 


 COPD with exacerbation





Condition: Stable


Disposition: HOME, SELF-CARE


Instructions:  Chronic Obstructive Lung Disease ([facility])


Additional Instructions: 


Please follow-up with your primary care physician and pulmonologist.  Please 

return immediately if you have any worsening symptoms.  Please continue to use 

your albuterol, Spiriva, Symbicort, prednisone at home.  Please quarantine at 

home until you await the COVID-19 results.  





As a person under investigation for COVID-19, [place] department of 

Health and Human Services, division of public health advises you to adhere to 

the following guidance until your test results are reported to you.  If your 

test result is positive, you will receive additional information from your [facility]

serge and your local health department at that time.





Remain at home until you are cleared by the healthcare provider public health 

authorities.





Keep a log of visitors to your home and notify any visitors to your home of your

isolation status.





If you plan to move to a new address or leave the country, notify the local 

health department and your County.





Call your doctor or seek care if you have an urgent medical need.  Before 

seeking medical care, call ahead to get instructions from the provider before 

arriving at the medical office, clinic, or hospital.  Notify them that you are 

being tested for the virus that causes COVID-19 so that arrangements can be 

made, as necessary, to prevent transmission to others in the healthcare setting.

 Next, notify the local health department and your County.





If a medical emergency arises and you need to call 911, informed the first 

responders that you are being tested for the virus that causes COVID-19.  Next, 

notified the [facility] health department and your County.


Prescriptions: 


Albuterol Sulfate [Ventolin 0.083% Neb 2.5 mg/3 mL Ampul] 1 vial NEB Q4 #100 

vial


Referrals: 


BRANDY PARRISH MD [Primary Care Provider] - Follow up in 1 week


VALERIE GARCIA MD [ACTIVE STAFF] - Follow up in 1 week

## 2020-12-06 NOTE — RADIOLOGY REPORT (SQ)
EXAM: CHEST SINGLE VIEW



CLINICAL INDICATION: 65-year-old female with respiratory

distress.



TECHNIQUE: Single view, AP portable chest was obtained.



COMPARISON:  9/17/2020.. 



FINDINGS: 



 Unremarkable cardiac and mediastinal silhouette. Heart size is

normal. Tortuous atherosclerotic thoracic aorta.

Lungs are clear without focal opacity, pneumothorax or pleural

effusions.  The visualized bones are within normal limits. 



IMPRESSION:  No acute cardiopulmonary abnormalities.

## 2024-03-07 NOTE — RADIOLOGY REPORT (SQ)
Chest single view on  1/5/2020 at 11:26 PM 



CLINICAL INDICATION: Respiratory difficulty



COMPARISON: 11/9/2019



FINDINGS: The lungs are clear. Mild vascular calcification is

noted in the aorta. Cardiac, hilar and mediastinal contours are

within normal limits. Pulmonary vascularity is within normal

limits. No bony abnormality is noted.



IMPRESSION: No active disease. walker